# Patient Record
Sex: FEMALE | Race: OTHER | HISPANIC OR LATINO | Employment: FULL TIME | URBAN - METROPOLITAN AREA
[De-identification: names, ages, dates, MRNs, and addresses within clinical notes are randomized per-mention and may not be internally consistent; named-entity substitution may affect disease eponyms.]

---

## 2023-03-07 PROBLEM — B00.1 HERPES LABIALIS: Status: ACTIVE | Noted: 2023-03-07

## 2023-03-07 PROBLEM — R10.30 LOWER ABDOMINAL PAIN: Status: ACTIVE | Noted: 2023-03-07

## 2023-03-07 PROBLEM — E03.9 HYPOTHYROIDISM: Status: ACTIVE | Noted: 2023-03-07

## 2023-03-07 PROBLEM — J45.909 ASTHMA (HHS-HCC): Status: ACTIVE | Noted: 2023-03-07

## 2023-03-07 PROBLEM — K92.1 BLOOD IN STOOL: Status: ACTIVE | Noted: 2023-03-07

## 2023-03-07 PROBLEM — E04.1 THYROID NODULE: Status: ACTIVE | Noted: 2023-03-07

## 2023-03-07 PROBLEM — D72.819 LEUKOPENIA: Status: ACTIVE | Noted: 2023-03-07

## 2023-03-07 RX ORDER — LORATADINE 10 MG/1
1 TABLET ORAL DAILY
COMMUNITY
Start: 2022-12-02

## 2023-03-07 RX ORDER — RIBOFLAVIN (VITAMIN B2) 100 MG
1 TABLET ORAL DAILY
COMMUNITY
Start: 2022-12-02 | End: 2023-03-29 | Stop reason: ALTCHOICE

## 2023-03-07 RX ORDER — GLUCOSAMINE SULFATE 500 MG
1 TABLET ORAL DAILY
COMMUNITY
Start: 2022-12-02 | End: 2023-05-17 | Stop reason: ALTCHOICE

## 2023-03-07 RX ORDER — MINOCYCLINE HYDROCHLORIDE 100 MG/1
1 TABLET ORAL NIGHTLY
COMMUNITY
Start: 2022-12-02 | End: 2023-05-17 | Stop reason: ALTCHOICE

## 2023-03-07 RX ORDER — ALBUTEROL SULFATE 90 UG/1
2 AEROSOL, METERED RESPIRATORY (INHALATION)
COMMUNITY
Start: 2022-12-02 | End: 2023-04-12

## 2023-03-07 RX ORDER — ASPIRIN 325 MG
1 TABLET, DELAYED RELEASE (ENTERIC COATED) ORAL
COMMUNITY
Start: 2022-12-02

## 2023-03-07 RX ORDER — LEVOTHYROXINE SODIUM 112 UG/1
1 TABLET ORAL DAILY
COMMUNITY
Start: 2022-12-02 | End: 2023-03-29

## 2023-03-07 RX ORDER — IBUPROFEN 100 MG/5ML
1 SUSPENSION, ORAL (FINAL DOSE FORM) ORAL DAILY
COMMUNITY
Start: 2022-12-02

## 2023-03-07 RX ORDER — LANOLIN ALCOHOL/MO/W.PET/CERES
1 CREAM (GRAM) TOPICAL DAILY
COMMUNITY
Start: 2022-12-02

## 2023-03-15 ENCOUNTER — APPOINTMENT (OUTPATIENT)
Dept: PRIMARY CARE | Facility: CLINIC | Age: 28
End: 2023-03-15
Payer: COMMERCIAL

## 2023-03-21 ENCOUNTER — APPOINTMENT (OUTPATIENT)
Dept: PRIMARY CARE | Facility: CLINIC | Age: 28
End: 2023-03-21
Payer: COMMERCIAL

## 2023-03-22 ENCOUNTER — APPOINTMENT (OUTPATIENT)
Dept: PRIMARY CARE | Facility: CLINIC | Age: 28
End: 2023-03-22
Payer: COMMERCIAL

## 2023-03-29 ENCOUNTER — LAB (OUTPATIENT)
Dept: LAB | Facility: LAB | Age: 28
End: 2023-03-29
Payer: COMMERCIAL

## 2023-03-29 ENCOUNTER — OFFICE VISIT (OUTPATIENT)
Dept: PRIMARY CARE | Facility: CLINIC | Age: 28
End: 2023-03-29
Payer: COMMERCIAL

## 2023-03-29 VITALS
DIASTOLIC BLOOD PRESSURE: 69 MMHG | BODY MASS INDEX: 27.51 KG/M2 | OXYGEN SATURATION: 99 % | HEIGHT: 62 IN | HEART RATE: 88 BPM | SYSTOLIC BLOOD PRESSURE: 113 MMHG

## 2023-03-29 DIAGNOSIS — E06.3 HYPOTHYROIDISM DUE TO HASHIMOTO'S THYROIDITIS: ICD-10-CM

## 2023-03-29 DIAGNOSIS — F41.9 ANXIETY: ICD-10-CM

## 2023-03-29 DIAGNOSIS — R00.2 PALPITATION: ICD-10-CM

## 2023-03-29 DIAGNOSIS — E06.3 HYPOTHYROIDISM DUE TO HASHIMOTO'S THYROIDITIS: Primary | ICD-10-CM

## 2023-03-29 DIAGNOSIS — E03.8 HYPOTHYROIDISM DUE TO HASHIMOTO'S THYROIDITIS: Primary | ICD-10-CM

## 2023-03-29 DIAGNOSIS — E03.8 HYPOTHYROIDISM DUE TO HASHIMOTO'S THYROIDITIS: ICD-10-CM

## 2023-03-29 LAB
MAGNESIUM (MG/DL) IN SER/PLAS: 2.05 MG/DL (ref 1.6–2.4)
THYROTROPIN (MIU/L) IN SER/PLAS BY DETECTION LIMIT <= 0.05 MIU/L: 0.55 MIU/L (ref 0.44–3.98)
TRIIODOTHYRONINE (T3) FREE (PG/ML) IN SER/PLAS: 4.1 PG/ML (ref 2.3–4.2)

## 2023-03-29 PROCEDURE — 83735 ASSAY OF MAGNESIUM: CPT

## 2023-03-29 PROCEDURE — 84443 ASSAY THYROID STIM HORMONE: CPT

## 2023-03-29 PROCEDURE — 84481 FREE ASSAY (FT-3): CPT

## 2023-03-29 PROCEDURE — 1036F TOBACCO NON-USER: CPT | Performed by: FAMILY MEDICINE

## 2023-03-29 PROCEDURE — 99214 OFFICE O/P EST MOD 30 MIN: CPT | Performed by: FAMILY MEDICINE

## 2023-03-29 PROCEDURE — 36415 COLL VENOUS BLD VENIPUNCTURE: CPT

## 2023-03-29 RX ORDER — LEVOTHYROXINE SODIUM 125 UG/1
125 CAPSULE ORAL
COMMUNITY
End: 2023-11-08 | Stop reason: ALTCHOICE

## 2023-03-29 RX ORDER — HYDROXYZINE HYDROCHLORIDE 25 MG/1
25 TABLET, FILM COATED ORAL 3 TIMES DAILY
Qty: 90 TABLET | Refills: 0 | Status: SHIPPED | OUTPATIENT
Start: 2023-03-29 | End: 2023-11-03 | Stop reason: ALTCHOICE

## 2023-03-29 RX ORDER — FERROUS SULFATE 325(65) MG
65 TABLET ORAL
COMMUNITY

## 2023-03-29 RX ORDER — CLONAZEPAM 0.5 MG/1
TABLET ORAL
Qty: 7 TABLET | Refills: 0 | Status: SHIPPED | OUTPATIENT
Start: 2023-03-29 | End: 2024-01-15 | Stop reason: WASHOUT

## 2023-03-29 NOTE — PROGRESS NOTES
Subjective   Patient ID: Nargis Strong [id number] is a 27 y.o. female who presents for Follow-up (Discuss meds).    HPI   Hypothyroidism - on levothyroxine 125 mcg daily.   By endocrine in February.  Levothyroxine was increased from 1 12 to 125mg  end of February.  Taking medication as prescribed.    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   Pt is noticing Sweating , palpitations and passed out at work. BP was normal.  Pt is increasing anxiety, having CP and Shakes and unable to sleep.  Raising thoughts +  Increasing fatigue, nausea and unable to eat.   Grand father passed away 2 weeks ago  due to major Heart attack.   Very restless and not sleeping.  Tired all day , worries a lot.   Denies feeling sad or low.  No SI or HI.  No crying spells.  No other triggers  Patient work environment is good and enjoying nursing    Social History     Tobacco Use    Smoking status: Never    Smokeless tobacco: Never   Vaping Use    Vaping status: Former     Substances: Nicotine, Flavoring     Devices: Disposable   Substance Use Topics    Drug use: Not Currently     Types: Marijuana       No Known Allergies    Current Outpatient Medications   Medication Sig Dispense Refill    ascorbic acid (Vitamin C) 1,000 mg tablet Take 1 tablet (1,000 mg) by mouth once daily.      cholecalciferol (Vitamin D-3) 1,250 mcg (50,000 unit) capsule Take 1 capsule (50,000 Units) by mouth 1 (one) time per week.      levothyroxine (Synthroid, Levoxyl) 112 mcg tablet Take 1 tablet (112 mcg) by mouth once daily.      loratadine (Claritin) 10 mg tablet Take 1 tablet (10 mg) by mouth once daily.      magnesium oxide (Mag-Ox) 400 mg (241.3 mg magnesium) tablet Take 1 tablet (400 mg) by mouth once daily.      minocycline (Dynacin) 100 mg tablet Take 1 tablet (100 mg) by mouth once daily at bedtime.      albuterol 90 mcg/actuation inhaler Inhale 2 puffs. INHALE 2 PUFFS BY MOUTH PRIOR  TO EXERCISE DIRECTED      ferrous sulfate 325 (65 Fe) MG  "tablet Take 1 tablet (65 mg of iron) by mouth once daily with a meal.      lysine 1,000 mg tablet Take 1 tablet (1,000 mg) by mouth once daily.       No current facility-administered medications for this visit.       Physical Exam  /69   Pulse 88   Ht 1.562 m (5' 1.5\")   SpO2 99%   BMI 27.51 kg/m²     General Appearance:  Alert, cooperative, no distress,   Head:  Normocephalic, atraumatic   Eyes:  PERRL, conjunctiva/corneas clear, EOM's intact,    Lungs:   Clear to auscultation bilaterally, respirations unlabored   Heart:  Regular rate and rhythm, S1 and S2 normal, no murmur,    Neurologic: Normal      No visits with results within 3 Month(s) from this visit.   Latest known visit with results is:   [facility] Encounter on 12/28/2022   Component Date Value Ref Range Status    TSH 12/28/2022 5.16 (H)  0.44 - 3.98 mIU/L Final    Comment:  TSH testing is performed using different testing    methodology at [facility] than at other    [facility]. Direct result comparisons should    only be made within the same method.      Vitamin D, 25-Hydroxy 12/28/2022 18 (A)  ng/mL Final    Comment: .  DEFICIENCY:         < 20   NG/ML  INSUFFICIENCY:      20-29  NG/ML  SUFFICIENCY:         NG/ML    THIS ASSAY ACCURATELY QUANTIFIES THE SUM OF  VITAMIN D3, 25-HYDROXY AND VIT D2,25-HYDROXY.      Glucose 12/28/2022 92  74 - 99 mg/dL Final    Sodium 12/28/2022 138  136 - 145 mmol/L Final    Potassium 12/28/2022 3.9  3.5 - 5.3 mmol/L Final    Chloride 12/28/2022 105  98 - 107 mmol/L Final    Bicarbonate 12/28/2022 27  21 - 32 mmol/L Final    Anion Gap 12/28/2022 10  10 - 20 mmol/L Final    Urea Nitrogen 12/28/2022 11  6 - 23 mg/dL Final    Creatinine 12/28/2022 0.69  0.50 - 1.05 mg/dL Final    GFR Female 12/28/2022 >90  >90 mL/min/1.73m2 Final    Comment:  CALCULATIONS OF ESTIMATED GFR ARE PERFORMED   USING THE 2021 CKD-EPI STUDY REFIT EQUATION   WITHOUT THE RACE VARIABLE FOR THE IDMS-TRACEABLE   CREATININE " METHODS.    https://jasn.asnjournals.org/content/early/2021/09/22/ASN.9381511886      Calcium 12/28/2022 8.8  8.6 - 10.3 mg/dL Final    Albumin 12/28/2022 4.0  3.4 - 5.0 g/dL Final    Alkaline Phosphatase 12/28/2022 58  33 - 110 U/L Final    Total Protein 12/28/2022 7.5  6.4 - 8.2 g/dL Final    AST 12/28/2022 18  9 - 39 U/L Final    Total Bilirubin 12/28/2022 0.4  0.0 - 1.2 mg/dL Final    ALT (SGPT) 12/28/2022 14  7 - 45 U/L Final    Comment:  Patients treated with Sulfasalazine may generate    falsely decreased results for ALT.         Assessment/Plan   Diagnoses and all orders for this visit:  Hypothyroidism due to Hashimoto's thyroiditis  -     TSH with reflex to Free T4 if abnormal; Future  -     T3, free; Future  Anxiety  -     Magnesium; Future  -     hydrOXYzine HCL (Atarax) 25 mg tablet; Take 1 tablet (25 mg) by mouth in the morning and 1 tablet (25 mg) in the evening and 1 tablet (25 mg) before bedtime.  Palpitation  -     TSH with reflex to Free T4 if abnormal; Future  -     Magnesium; Future  -     clonazePAM (KlonoPIN) 0.5 mg tablet; 0. 5 to 1 tablet at night for sleep as needed    7 tablet of low-dose Klonopin given for sleep.  OARRS reviewed and appropriate.  Will reevaluate anxiety and thyroid in 1 week

## 2023-04-05 ENCOUNTER — APPOINTMENT (OUTPATIENT)
Dept: PRIMARY CARE | Facility: CLINIC | Age: 28
End: 2023-04-05
Payer: COMMERCIAL

## 2023-04-06 ENCOUNTER — TELEPHONE (OUTPATIENT)
Dept: PRIMARY CARE | Facility: CLINIC | Age: 28
End: 2023-04-06
Payer: COMMERCIAL

## 2023-04-06 NOTE — TELEPHONE ENCOUNTER
PT IS REQUESTING A B.W ORDER FOR PREGANANCY. SHE HAS MISSED 2 PERIODS AND HAS TAKEN 3 HOME PREGENCY TESTS AND ALL TEST SHOWED NOT PREG OR PREGNANT  PLEASE ADVISE

## 2023-04-07 ENCOUNTER — APPOINTMENT (OUTPATIENT)
Dept: LAB | Facility: LAB | Age: 28
End: 2023-04-07
Payer: COMMERCIAL

## 2023-04-07 LAB — CHORIOGONADOTROPIN (MIU/ML) IN SER/PLAS: <3 IU/L

## 2023-04-10 ENCOUNTER — TELEPHONE (OUTPATIENT)
Dept: PRIMARY CARE | Facility: CLINIC | Age: 28
End: 2023-04-10
Payer: COMMERCIAL

## 2023-04-10 NOTE — TELEPHONE ENCOUNTER
----- Message from Leonora Schuler MD sent at 4/9/2023  6:07 PM EDT -----  Pregnancy test negative

## 2023-04-12 ENCOUNTER — LAB (OUTPATIENT)
Dept: LAB | Facility: LAB | Age: 28
End: 2023-04-12
Payer: COMMERCIAL

## 2023-04-12 ENCOUNTER — OFFICE VISIT (OUTPATIENT)
Dept: PRIMARY CARE | Facility: CLINIC | Age: 28
End: 2023-04-12
Payer: COMMERCIAL

## 2023-04-12 VITALS
WEIGHT: 153 LBS | SYSTOLIC BLOOD PRESSURE: 117 MMHG | DIASTOLIC BLOOD PRESSURE: 77 MMHG | BODY MASS INDEX: 28.16 KG/M2 | HEART RATE: 80 BPM | HEIGHT: 62 IN | OXYGEN SATURATION: 99 %

## 2023-04-12 DIAGNOSIS — E03.9 ADULT HYPOTHYROIDISM: ICD-10-CM

## 2023-04-12 DIAGNOSIS — E03.9 ADULT HYPOTHYROIDISM: Primary | ICD-10-CM

## 2023-04-12 DIAGNOSIS — F41.9 ANXIETY: ICD-10-CM

## 2023-04-12 PROCEDURE — 1036F TOBACCO NON-USER: CPT | Performed by: FAMILY MEDICINE

## 2023-04-12 PROCEDURE — 99214 OFFICE O/P EST MOD 30 MIN: CPT | Performed by: FAMILY MEDICINE

## 2023-04-12 PROCEDURE — 84439 ASSAY OF FREE THYROXINE: CPT

## 2023-04-12 PROCEDURE — 36415 COLL VENOUS BLD VENIPUNCTURE: CPT

## 2023-04-12 RX ORDER — LEVOTHYROXINE SODIUM 112 UG/1
TABLET ORAL
Qty: 30 TABLET | Refills: 0 | Status: SHIPPED | OUTPATIENT
Start: 2023-04-12 | End: 2023-11-08 | Stop reason: ALTCHOICE

## 2023-04-12 RX ORDER — ESCITALOPRAM OXALATE 5 MG/1
5 TABLET ORAL DAILY
Qty: 30 TABLET | Refills: 0 | Status: SHIPPED | OUTPATIENT
Start: 2023-04-12 | End: 2023-11-03 | Stop reason: ALTCHOICE

## 2023-04-12 NOTE — PROGRESS NOTES
Subjective   Patient ID: Nargis Strong [id number] is a 27 y.o. female who presents for Follow-up (1 week follow up).    HPI     Pt is here for follow up on Anxiety.  Pt is taking hydroxyzine 25 mg BID every other day and taking clonapin every other day.   Feeling better.  Patient feeling calmer.  No nausea or vomiting  Sleeping better  No Chest pain or shakes  No crying spells sleeping good.  Racing thoughts at night getting better  Lab testing for thyroid reviewed.  Hypothyroid - taking levothyroxine 125 mcg daily    Social History     Tobacco Use    Smoking status: Never    Smokeless tobacco: Never   Vaping Use    Vaping status: Former     Substances: Nicotine, Flavoring     Devices: Disposable   Substance Use Topics    Drug use: Not Currently     Types: Marijuana       No Known Allergies    Current Outpatient Medications   Medication Sig Dispense Refill    ascorbic acid (Vitamin C) 1,000 mg tablet Take 1 tablet (1,000 mg) by mouth once daily.      cholecalciferol (Vitamin D-3) 1,250 mcg (50,000 unit) capsule Take 1 capsule (50,000 Units) by mouth 1 (one) time per week.      clonazePAM (KlonoPIN) 0.5 mg tablet 0. 5 to 1 tablet at night for sleep as needed 7 tablet 0    ferrous sulfate 325 (65 Fe) MG tablet Take 1 tablet (65 mg of iron) by mouth once daily with a meal.      hydrOXYzine HCL (Atarax) 25 mg tablet Take 1 tablet (25 mg) by mouth in the morning and 1 tablet (25 mg) in the evening and 1 tablet (25 mg) before bedtime. 90 tablet 0    levothyroxine (Tirosint) 125 mcg capsule Take 1 capsule (125 mcg) by mouth once daily in the morning. Take before meals.      loratadine (Claritin) 10 mg tablet Take 1 tablet (10 mg) by mouth once daily.      magnesium oxide (Mag-Ox) 400 mg (241.3 mg magnesium) tablet Take 1 tablet (400 mg) by mouth once daily. Taking 350mg now      minocycline (Dynacin) 100 mg tablet Take 1 tablet (100 mg) by mouth once daily at bedtime.      albuterol 90 mcg/actuation inhaler Inhale 2 puffs.  "INHALE 2 PUFFS BY MOUTH PRIOR  TO EXERCISE DIRECTED      lysine 1,000 mg tablet Take 1 tablet (1,000 mg) by mouth once daily.       No current facility-administered medications for this visit.       Physical Exam  /77 (BP Location: Right arm, Patient Position: Sitting, BP Cuff Size: Adult)   Pulse 80   Ht 1.562 m (5' 1.5\")   Wt 69.4 kg (153 lb)   SpO2 99%   BMI 28.44 kg/m²     General Appearance:  Alert, cooperative, no distress,   Head:  Normocephalic, atraumatic   Eyes:  PERRL, conjunctiva/corneas clear, EOM's intact,    Lungs:   Clear to auscultation bilaterally, respirations unlabored   Heart:  Regular rate and rhythm, S1 and S2 normal, no murmur,    Neurologic: Normal      Orders Only on 04/07/2023   Component Date Value Ref Range Status    hCG Quantitative 04/07/2023 <3  IU/L Final    Comment: .  Total HCG measurement is performed using the Siemens [id number]  immunoassay which detects intact HCG and free beta HCG subunit.  .  This test is not indicated for use as a tumor marker.  HCG testing is performed using a different test methodology at [facility] than other [facility]. Direct result comparison  should only be made within the same method.  .  REF VALUES  NONPREGNANT FEMALE <5  MALES              <5     Lab on 03/29/2023   Component Date Value Ref Range Status    TSH 03/29/2023 0.55  0.44 - 3.98 mIU/L Final     TSH testing is performed using different testing    methodology at [facility] than at other    [facility]. Direct result comparisons should    only be made within the same method.    Magnesium 03/29/2023 2.05  1.60 - 2.40 mg/dL Final    T3, Free 03/29/2023 4.1  2.3 - 4.2 pg/mL Final       Assessment/Plan   Diagnoses and all orders for this visit:  Adult hypothyroidism  -     levothyroxine (Synthroid, Levoxyl) 112 mcg tablet; Take 1 tablet on Saturday and sunday  -     T4, free; Future  Patient was advised to get free T4.  Advised to continue " levothyroxine 125 on weekdays and take 112 on Saturday and Sunday.    Anxiety   advised to continue hydroxyzine as needed  -     escitalopram (Lexapro) 5 mg tablet; Take 1 tablet (5 mg) by mouth once daily.

## 2023-04-13 LAB — THYROXINE (T4) FREE (NG/DL) IN SER/PLAS: 0.98 NG/DL (ref 0.78–1.48)

## 2023-04-26 ENCOUNTER — APPOINTMENT (OUTPATIENT)
Dept: PRIMARY CARE | Facility: CLINIC | Age: 28
End: 2023-04-26
Payer: COMMERCIAL

## 2023-05-04 ENCOUNTER — APPOINTMENT (OUTPATIENT)
Dept: PRIMARY CARE | Facility: CLINIC | Age: 28
End: 2023-05-04
Payer: COMMERCIAL

## 2023-05-11 ENCOUNTER — TELEPHONE (OUTPATIENT)
Dept: PRIMARY CARE | Facility: CLINIC | Age: 28
End: 2023-05-11
Payer: COMMERCIAL

## 2023-05-11 NOTE — TELEPHONE ENCOUNTER
Pt. Tried scheduling with derm doctor but the earliest she can have an appt is mid august. Would like to know if she can get prescribe medicine for her medical condition or if she can get referred to someone else.

## 2023-05-17 ENCOUNTER — TELEMEDICINE (OUTPATIENT)
Dept: PRIMARY CARE | Facility: CLINIC | Age: 28
End: 2023-05-17
Payer: COMMERCIAL

## 2023-05-17 DIAGNOSIS — L70.0 ACNE VULGARIS: Primary | ICD-10-CM

## 2023-05-17 PROCEDURE — 99213 OFFICE O/P EST LOW 20 MIN: CPT | Performed by: FAMILY MEDICINE

## 2023-05-17 RX ORDER — MINOCYCLINE HYDROCHLORIDE 100 MG/1
100 TABLET ORAL NIGHTLY
Qty: 90 TABLET | Refills: 0 | Status: SHIPPED | OUTPATIENT
Start: 2023-05-17 | End: 2023-06-15 | Stop reason: SDUPTHER

## 2023-05-17 NOTE — PROGRESS NOTES
This visit was completed via VIRTUAL VIDEO/Audio due to the restrictions of the COVID-19 pandemic. All issues as below were discussed and addressed but no physical exam was performed. If it was felt that the patient should be evaluated in clinic then they were directed there. The patient verbally consented to visit.      Patient ID: Nargis Strong [id number] is a 27 y.o. female who presents  calling for concern for acne.    HPI   Patient was only mildly slightly couple of years ago for acne by her dermatologist.  Medication was working very well.  Patient ran out of refill.  Patient does not have appointment with dermatology till August 2023.  No help with gel or lotion.  Patient is asking for minocycline.    Social History     Tobacco Use    Smoking status: Never    Smokeless tobacco: Never   Vaping Use    Vaping status: Former     Substances: Nicotine, Flavoring     Devices: Disposable   Substance Use Topics    Drug use: Not Currently     Types: Marijuana       No Known Allergies    Current Outpatient Medications   Medication Sig Dispense Refill    ascorbic acid (Vitamin C) 1,000 mg tablet Take 1 tablet (1,000 mg) by mouth once daily.      cholecalciferol (Vitamin D-3) 1,250 mcg (50,000 unit) capsule Take 1 capsule (50,000 Units) by mouth 1 (one) time per week.      clonazePAM (KlonoPIN) 0.5 mg tablet 0. 5 to 1 tablet at night for sleep as needed 7 tablet 0    escitalopram (Lexapro) 5 mg tablet Take 1 tablet (5 mg) by mouth once daily. 30 tablet 0    ferrous sulfate 325 (65 Fe) MG tablet Take 1 tablet (65 mg of iron) by mouth once daily with a meal.      hydrOXYzine HCL (Atarax) 25 mg tablet Take 1 tablet (25 mg) by mouth in the morning and 1 tablet (25 mg) in the evening and 1 tablet (25 mg) before bedtime. 90 tablet 0    levothyroxine (Synthroid, Levoxyl) 112 mcg tablet Take 1 tablet on Saturday and sunday 30 tablet 0    levothyroxine (Tirosint) 125 mcg capsule Take 1 capsule (125 mcg) by mouth once daily in the  morning. Take before meals.      loratadine (Claritin) 10 mg tablet Take 1 tablet (10 mg) by mouth once daily.      lysine 1,000 mg tablet Take 1 tablet (1,000 mg) by mouth once daily.      magnesium oxide (Mag-Ox) 400 mg (241.3 mg magnesium) tablet Take 1 tablet (400 mg) by mouth once daily. Taking 350mg now      minocycline (Dynacin) 100 mg tablet Take 1 tablet (100 mg) by mouth once daily at bedtime.       No current facility-administered medications for this visit.       There were no vitals taken for this visit.      Assessment/Plan   Diagnoses and all orders  for this visit:  Acne vulgaris  -     minocycline (Dynacin) 100 mg tablet; Take 1 tablet (100 mg) by mouth once daily at bedtime.  Patient currently having regular Periods.   No concern for pregnancy.  Follow-up dermatology.

## 2023-05-31 ENCOUNTER — OFFICE VISIT (OUTPATIENT)
Dept: PRIMARY CARE | Facility: CLINIC | Age: 28
End: 2023-05-31
Payer: COMMERCIAL

## 2023-05-31 VITALS
HEIGHT: 61 IN | SYSTOLIC BLOOD PRESSURE: 104 MMHG | WEIGHT: 142.2 LBS | BODY MASS INDEX: 26.85 KG/M2 | OXYGEN SATURATION: 98 % | HEART RATE: 70 BPM | DIASTOLIC BLOOD PRESSURE: 72 MMHG

## 2023-05-31 DIAGNOSIS — J30.2 SEASONAL ALLERGIES: ICD-10-CM

## 2023-05-31 DIAGNOSIS — J30.9 CHRONIC ALLERGIC RHINITIS: Primary | ICD-10-CM

## 2023-05-31 PROCEDURE — 1036F TOBACCO NON-USER: CPT | Performed by: FAMILY MEDICINE

## 2023-05-31 PROCEDURE — 99213 OFFICE O/P EST LOW 20 MIN: CPT | Performed by: FAMILY MEDICINE

## 2023-05-31 PROCEDURE — 96372 THER/PROPH/DIAG INJ SC/IM: CPT | Performed by: FAMILY MEDICINE

## 2023-05-31 RX ORDER — IBUPROFEN 800 MG/1
800 TABLET ORAL EVERY 6 HOURS PRN
COMMUNITY
Start: 2023-01-03 | End: 2023-11-03 | Stop reason: ALTCHOICE

## 2023-05-31 RX ORDER — VALACYCLOVIR HYDROCHLORIDE 1 G/1
2000 TABLET, FILM COATED ORAL AS NEEDED
COMMUNITY
Start: 2023-01-31 | End: 2023-11-07 | Stop reason: SDUPTHER

## 2023-05-31 RX ORDER — TRIAMCINOLONE ACETONIDE 40 MG/ML
20 INJECTION, SUSPENSION INTRA-ARTICULAR; INTRAMUSCULAR ONCE
Status: COMPLETED | OUTPATIENT
Start: 2023-05-31 | End: 2023-05-31

## 2023-05-31 RX ADMIN — TRIAMCINOLONE ACETONIDE 20 MG: 40 INJECTION, SUSPENSION INTRA-ARTICULAR; INTRAMUSCULAR at 11:47

## 2023-05-31 NOTE — LETTER
May 31, 2023     Patient: Nargis Strong   YOB: 1995   Date of Visit: 5/31/2023       To Whom It May Concern:    It is my medical opinion that Nargis Strong {Work release (duty restriction):54609}.    If you have any questions or concerns, please don't hesitate to call.         Sincerely,        Leonora Schuler MD    CC:   No Recipients

## 2023-05-31 NOTE — PROGRESS NOTES
Subjective   Patient ID: Nargis Strong [id number] is a 27 y.o. female who presents for Injections (Kenolog injection for allergies).    HPI     Pt is taking claritin, flonase and benadryl for seasonal allergies.  Stuffy nose, runny nose, post nasal drip , sneezing  Itchy eyes and watery eyes.  Pt usually  get kenalog shot once a year in spring or summer.   Social History     Tobacco Use    Smoking status: Never    Smokeless tobacco: Never   Vaping Use    Vaping status: Former     Substances: Nicotine, Flavoring     Devices: Disposable   Substance Use Topics    Drug use: Not Currently     Types: Marijuana       Allergies   Allergen Reactions    Seasonale (91) [Levonorgestrel-Ethinyl Estrad] Runny nose       Current Outpatient Medications   Medication Sig Dispense Refill    ascorbic acid (Vitamin C) 1,000 mg tablet Take 1 tablet (1,000 mg) by mouth once daily.      cholecalciferol (Vitamin D-3) 1,250 mcg (50,000 unit) capsule Take 1 capsule (50,000 Units) by mouth 1 (one) time per week.      clonazePAM (KlonoPIN) 0.5 mg tablet 0. 5 to 1 tablet at night for sleep as needed 7 tablet 0    escitalopram (Lexapro) 5 mg tablet Take 1 tablet (5 mg) by mouth once daily. 30 tablet 0    ferrous sulfate 325 (65 Fe) MG tablet Take 1 tablet (65 mg of iron) by mouth once daily with a meal.      ibuprofen 800 mg tablet Take 1 tablet (800 mg) by mouth every 6 hours if needed for mild pain (1 - 3).      levothyroxine (Synthroid, Levoxyl) 112 mcg tablet Take 1 tablet on Saturday and sunday 30 tablet 0    levothyroxine (Tirosint) 125 mcg capsule Take 1 capsule (125 mcg) by mouth once daily in the morning. Take before meals.      loratadine (Claritin) 10 mg tablet Take 1 tablet (10 mg) by mouth once daily.      magnesium oxide (Mag-Ox) 400 mg (241.3 mg magnesium) tablet Take 1 tablet (400 mg) by mouth once daily. Taking 350mg now      minocycline (Dynacin) 100 mg tablet Take 1 tablet (100 mg) by mouth once daily at bedtime. 90 tablet 0     "valACYclovir (Valtrex) 1 gram tablet Take 2 tablets (2,000 mg) by mouth if needed.      hydrOXYzine HCL (Atarax) 25 mg tablet Take 1 tablet (25 mg) by mouth in the morning and 1 tablet (25 mg) in the evening and 1 tablet (25 mg) before bedtime. 90 tablet 0     No current facility-administered medications for this visit.       Physical Exam  /72   Pulse 70   Ht 1.549 m (5' 1\")   Wt 64.5 kg (142 lb 3.2 oz)   SpO2 98%   BMI 26.87 kg/m²     General Appearance:  Alert, cooperative, no distress,   Head:  Normocephalic, atraumatic   Eyes:  PERRL, conjunctiva/corneas clear, EOM's intact,    Lungs:   Clear to auscultation bilaterally, respirations unlabored   Heart:  Regular rate and rhythm, S1 and S2 normal, no murmur,    Neurologic: Normal      Lab on 04/12/2023   Component Date Value Ref Range Status    Free T4 04/12/2023 0.98  0.78 - 1.48 ng/dL Final     Thyroxine Free testing is performed using different testing    methodology at [facility] than at other    [facility]. Direct result comparisons should    only be made within the same method.   Orders Only on 04/07/2023   Component Date Value Ref Range Status    hCG Quantitative 04/07/2023 <3  IU/L Final    Comment: .  Total HCG measurement is performed using the Siemens Atellica  immunoassay which detects intact HCG and free beta HCG subunit.  .  This test is not indicated for use as a tumor marker.  HCG testing is performed using a different test methodology at [facility] than other [facility]. Direct result comparison  should only be made within the same method.  .  REF VALUES  NONPREGNANT FEMALE <5  MALES              <5     Lab on 03/29/2023   Component Date Value Ref Range Status    TSH 03/29/2023 0.55  0.44 - 3.98 mIU/L Final     TSH testing is performed using different testing    methodology at [facility] than at other    [facility]. Direct result comparisons should    only be made within the " same method.    Magnesium 03/29/2023 2.05  1.60 - 2.40 mg/dL Final    T3, Free 03/29/2023 4.1  2.3 - 4.2 pg/mL Final       Assessment/Plan   Diagnoses and all orders for this visit:  Chronic allergic rhinitis  -     triamcinolone acetonide (Kenalog-40) injection 20 mg  Seasonal allergies  -     triamcinolone acetonide (Kenalog-40) injection 20 mg  Kenalog 20 given in the office.  Patient was advised to return to office if persistent symptoms.

## 2023-05-31 NOTE — LETTER
May 31, 2023     Patient: Nargis Strong   YOB: 1995   Date of Visit: 5/31/2023       To Whom It May Concern:    Nargis Strong was seen in my clinic on 5/31/2023 at 10:30 am. Please excuse Nargis for her absence from work on this day to make the appointment.    If you have any questions or concerns, please don't hesitate to call.         Sincerely,         Leonora Schuler MD        CC:   No Recipients

## 2023-05-31 NOTE — LETTER
May 31, 2023     Patient: Nargis Strong   YOB: 1995   Date of Visit: 5/31/2023       To Whom It May Concern:    Nargis Strong was seen in my clinic on 5/31/2023 at 10:30 am. Please excuse Nargis for her absence from work on this day to make the appointment.    If you have any questions or concerns, please don't hesitate to call.    Nargis left our office at 11:45am.         Sincerely,         Leonora Schuler MD        CC:   No Recipients

## 2023-06-15 DIAGNOSIS — L70.0 ACNE VULGARIS: ICD-10-CM

## 2023-06-15 RX ORDER — MINOCYCLINE HYDROCHLORIDE 100 MG/1
100 TABLET ORAL NIGHTLY
Qty: 90 TABLET | Refills: 0 | Status: SHIPPED | OUTPATIENT
Start: 2023-06-15 | End: 2023-11-16 | Stop reason: SDUPTHER

## 2023-07-07 ENCOUNTER — TELEPHONE (OUTPATIENT)
Dept: PRIMARY CARE | Facility: CLINIC | Age: 28
End: 2023-07-07
Payer: COMMERCIAL

## 2023-07-07 DIAGNOSIS — F41.9 ANXIETY: ICD-10-CM

## 2023-07-07 DIAGNOSIS — R53.83 OTHER FATIGUE: ICD-10-CM

## 2023-07-07 NOTE — TELEPHONE ENCOUNTER
PT STOPPED INTO OFFICE. STATES SHE NEEDS THE FOLLOWING ORDERED:    CBC  CMP  BMP  IRON  VIT D  LIPID  GFR  8.   TSH  9.   FT4  HAS APPT WITH OB/GYN 7/19/23

## 2023-07-11 ENCOUNTER — TELEPHONE (OUTPATIENT)
Dept: PRIMARY CARE | Facility: CLINIC | Age: 28
End: 2023-07-11
Payer: COMMERCIAL

## 2023-07-11 NOTE — TELEPHONE ENCOUNTER
Blood in stool for two days, but she isn't sure if it is something she needs to do now or wait for the appointment?      She does not see GI for a month and a half.  She is wondering what it may be and if the Dr needs to order a test or see her in office?    She does not want to go to ER.

## 2023-07-12 NOTE — TELEPHONE ENCOUNTER
Called pt to schedule appt for tomorrow pt states she has to work tomorrow and requested testing or virtual appt but does not want to wait. Was advised if gets worse she can go to ER or we can see her next available.

## 2023-08-02 ENCOUNTER — APPOINTMENT (OUTPATIENT)
Dept: PRIMARY CARE | Facility: CLINIC | Age: 28
End: 2023-08-02
Payer: COMMERCIAL

## 2023-09-09 ENCOUNTER — LAB (OUTPATIENT)
Dept: LAB | Facility: LAB | Age: 28
End: 2023-09-09
Payer: COMMERCIAL

## 2023-09-09 LAB — TOBACCO SCREEN, URINE: NEGATIVE

## 2023-11-02 ENCOUNTER — APPOINTMENT (OUTPATIENT)
Dept: ALLERGY | Facility: CLINIC | Age: 28
End: 2023-11-02
Payer: COMMERCIAL

## 2023-11-03 ENCOUNTER — LAB (OUTPATIENT)
Dept: LAB | Facility: LAB | Age: 28
End: 2023-11-03
Payer: COMMERCIAL

## 2023-11-03 ENCOUNTER — OFFICE VISIT (OUTPATIENT)
Dept: PRIMARY CARE | Facility: CLINIC | Age: 28
End: 2023-11-03
Payer: COMMERCIAL

## 2023-11-03 VITALS
HEART RATE: 84 BPM | HEIGHT: 61 IN | SYSTOLIC BLOOD PRESSURE: 117 MMHG | TEMPERATURE: 97.3 F | WEIGHT: 165 LBS | DIASTOLIC BLOOD PRESSURE: 77 MMHG | BODY MASS INDEX: 31.15 KG/M2

## 2023-11-03 DIAGNOSIS — F41.9 ANXIETY: Primary | ICD-10-CM

## 2023-11-03 DIAGNOSIS — Z88.9 H/O SEASONAL ALLERGIES: ICD-10-CM

## 2023-11-03 DIAGNOSIS — F41.9 ANXIETY: ICD-10-CM

## 2023-11-03 DIAGNOSIS — R53.83 OTHER FATIGUE: ICD-10-CM

## 2023-11-03 DIAGNOSIS — R63.5 WEIGHT GAIN: ICD-10-CM

## 2023-11-03 DIAGNOSIS — E03.8 OTHER SPECIFIED HYPOTHYROIDISM: ICD-10-CM

## 2023-11-03 LAB
25(OH)D3 SERPL-MCNC: 20 NG/ML (ref 30–100)
ALBUMIN SERPL BCP-MCNC: 4 G/DL (ref 3.4–5)
ALP SERPL-CCNC: 58 U/L (ref 33–110)
ALT SERPL W P-5'-P-CCNC: 12 U/L (ref 7–45)
ANION GAP SERPL CALC-SCNC: 8 MMOL/L (ref 10–20)
AST SERPL W P-5'-P-CCNC: 17 U/L (ref 9–39)
BASOPHILS # BLD AUTO: 0.07 X10*3/UL (ref 0–0.1)
BASOPHILS NFR BLD AUTO: 0.9 %
BILIRUB SERPL-MCNC: 0.4 MG/DL (ref 0–1.2)
BUN SERPL-MCNC: 9 MG/DL (ref 6–23)
CALCIUM SERPL-MCNC: 8.7 MG/DL (ref 8.6–10.3)
CHLORIDE SERPL-SCNC: 104 MMOL/L (ref 98–107)
CHOLEST SERPL-MCNC: 181 MG/DL (ref 0–199)
CHOLESTEROL/HDL RATIO: 3.8
CO2 SERPL-SCNC: 28 MMOL/L (ref 21–32)
CREAT SERPL-MCNC: 0.7 MG/DL (ref 0.5–1.05)
EOSINOPHIL # BLD AUTO: 0.08 X10*3/UL (ref 0–0.7)
EOSINOPHIL NFR BLD AUTO: 1.1 %
ERYTHROCYTE [DISTWIDTH] IN BLOOD BY AUTOMATED COUNT: 13.3 % (ref 11.5–14.5)
EST. AVERAGE GLUCOSE BLD GHB EST-MCNC: 97 MG/DL
GFR SERPL CREATININE-BSD FRML MDRD: >90 ML/MIN/1.73M*2
GLUCOSE SERPL-MCNC: 80 MG/DL (ref 74–99)
HBA1C MFR BLD: 5 %
HCT VFR BLD AUTO: 42.8 % (ref 36–46)
HDLC SERPL-MCNC: 48.1 MG/DL
HGB BLD-MCNC: 14 G/DL (ref 12–16)
IMM GRANULOCYTES # BLD AUTO: 0.02 X10*3/UL (ref 0–0.7)
IMM GRANULOCYTES NFR BLD AUTO: 0.3 % (ref 0–0.9)
LDLC SERPL CALC-MCNC: 117 MG/DL
LYMPHOCYTES # BLD AUTO: 1.77 X10*3/UL (ref 1.2–4.8)
LYMPHOCYTES NFR BLD AUTO: 23.8 %
MCH RBC QN AUTO: 29.8 PG (ref 26–34)
MCHC RBC AUTO-ENTMCNC: 32.7 G/DL (ref 32–36)
MCV RBC AUTO: 91 FL (ref 80–100)
MONOCYTES # BLD AUTO: 0.69 X10*3/UL (ref 0.1–1)
MONOCYTES NFR BLD AUTO: 9.3 %
NEUTROPHILS # BLD AUTO: 4.81 X10*3/UL (ref 1.2–7.7)
NEUTROPHILS NFR BLD AUTO: 64.6 %
NON HDL CHOLESTEROL: 133 MG/DL (ref 0–149)
NRBC BLD-RTO: 0 /100 WBCS (ref 0–0)
PLATELET # BLD AUTO: 320 X10*3/UL (ref 150–450)
POTASSIUM SERPL-SCNC: 4.4 MMOL/L (ref 3.5–5.3)
PROT SERPL-MCNC: 7.4 G/DL (ref 6.4–8.2)
RBC # BLD AUTO: 4.7 X10*6/UL (ref 4–5.2)
SODIUM SERPL-SCNC: 136 MMOL/L (ref 136–145)
T4 FREE SERPL-MCNC: 0.88 NG/DL (ref 0.61–1.12)
TRIGL SERPL-MCNC: 79 MG/DL (ref 0–149)
TSH SERPL-ACNC: 25.5 MIU/L (ref 0.44–3.98)
VLDL: 16 MG/DL (ref 0–40)
WBC # BLD AUTO: 7.4 X10*3/UL (ref 4.4–11.3)

## 2023-11-03 PROCEDURE — 84439 ASSAY OF FREE THYROXINE: CPT

## 2023-11-03 PROCEDURE — 36415 COLL VENOUS BLD VENIPUNCTURE: CPT

## 2023-11-03 PROCEDURE — 84443 ASSAY THYROID STIM HORMONE: CPT

## 2023-11-03 PROCEDURE — 99214 OFFICE O/P EST MOD 30 MIN: CPT | Performed by: INTERNAL MEDICINE

## 2023-11-03 PROCEDURE — 85025 COMPLETE CBC W/AUTO DIFF WBC: CPT

## 2023-11-03 PROCEDURE — 80061 LIPID PANEL: CPT

## 2023-11-03 PROCEDURE — 80053 COMPREHEN METABOLIC PANEL: CPT

## 2023-11-03 PROCEDURE — 3008F BODY MASS INDEX DOCD: CPT | Performed by: INTERNAL MEDICINE

## 2023-11-03 PROCEDURE — 1036F TOBACCO NON-USER: CPT | Performed by: INTERNAL MEDICINE

## 2023-11-03 PROCEDURE — 83036 HEMOGLOBIN GLYCOSYLATED A1C: CPT

## 2023-11-03 PROCEDURE — 82306 VITAMIN D 25 HYDROXY: CPT

## 2023-11-03 RX ORDER — CLINDAMYCIN PHOSPHATE 11.9 MG/ML
1 SOLUTION TOPICAL 2 TIMES DAILY
COMMUNITY
End: 2024-02-07 | Stop reason: WASHOUT

## 2023-11-03 ASSESSMENT — PATIENT HEALTH QUESTIONNAIRE - PHQ9
1. LITTLE INTEREST OR PLEASURE IN DOING THINGS: NOT AT ALL
SUM OF ALL RESPONSES TO PHQ9 QUESTIONS 1 AND 2: 0
2. FEELING DOWN, DEPRESSED OR HOPELESS: NOT AT ALL

## 2023-11-03 NOTE — PROGRESS NOTES
"Subjective   Patient ID: Nargis Strong is a 27 y.o. female who presents for Anxiety and Weight Loss.    HPI Pt lakshmi pleasant 27 y.o. F who was seen and evaluated during the OV. Pt states taht she concerned about weight gain. Pt states that she gained about 20 lbs for the last 2 months, even she was on healthy diet. PT also states that she has a lot of anxiety due the the stress at school and reports chronic fatigue.   During the clinical encounter pt denies fever, chills, no SOB, no chest pain/tightness, no abdominal pain, no N/V/D reported, no change of urination reported. Pt denies any other health concerns during this visit.  Sohx: reviewed  PMHx and Fhx: reviewed    Review of Systems  Constitutional: no fever, no chills, reports fatigue  Eyes: no eyesight problems, no eye redness, no eye pain, no blurred vision  ENT: no ear pain or sore throat, no nasal discharge or congestion, no sinus pressure, no hearing loss  Cardiovascular: no chest pain or tightness, no SOB, the heart rate was not fast or slow  Respiratory: no cough, no dyspnea with exertion or with rest, no wheezing  Gastrointestinal: no abdominal pain, no constipation or diarrhea, no heartburn, no nausea or vomiting  Genitourinary: no urine frequency, no dysuria, no urinary urgency, no urinary incontinence or retention  Musculoskeletal: no back pain, no arthralgia, no joint swelling or stiffness, no muscle weakness  Integumentary: no skin lesions or rash  Neurological: no headaches, no dizziness or fainting, no limb weakness  Psychiatric: no mood changes, no depression, no suicidal thoughts, but feels anxious  Endocrine: pt reports weight change, no temperature intolerance, no change of appetite  Hematologic/Lymphatic: no easy bruising or bleeding  Objective   /77 (BP Location: Left arm, Patient Position: Sitting)   Pulse 84   Temp 36.3 °C (97.3 °F)   Ht 1.549 m (5' 1\")   Wt 74.8 kg (165 lb)   BMI 31.18 kg/m²     Physical Exam  Constitutional: " well hydrated, well nourished, no acute distress. Vital signs reviewed  Head and face: normocephalic, atraumatic  Eyes: no erythema, edema or visible abnormalities of conjunctiva or lids. Pupils are equal, round and reactive to light. Extraocular movement normal  ENT: external ears without deformities  Neck: full ROM, neck was supple  Pulmonary: normal respiratory rate and effort. Lungs are clear to auscultation, no rales,no rhonchi or wheezing  Cardiovascular: regular rate and rhythm, normal S1 and S2, no murmur, no gallop, posterior tib. pulse normal 2+ bilaterally, no lower extremity edema  Abdomen: soft, non tender on palpation, not distended, normal BS in all 4 quadrants, no CVA tenderness  Musculoskeletal: no joint swelling, normal movements of all 4 extremities. Gait and station: normal, Digits and nails: normal without clubbing  Skin: normal color, no rash  Neurologic: Cranial nerves: CN II: visual acuity intact. Source: acuity reported by patient. Pupils reactive to light with normal accommodation. Right and left pupil normal CN III, IV and VI: the EO movements were intact. CN VIII: no hearing loss. Sensory exam: normal light to touch  Psychiatric: Mood and affect: normal, Alert and Oriented x 3  Lymphatic: no cervical lymphadenopathy    Assessment/Plan   HX of anxiety:  Hx and PE as mentioned  Will check TSH/T4 level  Pt might be beneficial form the SSRI/SNRI Tx    Hx of weight gain, hx of hypothyroidism, on levothyroxine  Will order TSH.T4, will screen for DM and lipid disorder    Fatigue: will order CBC, CMP and Vitamin D level    Hx of seasonal allergy: the referral for the allergist evaluation was provided as per the pts request    Plan was d/c with the pt in details, verbalized understanding, agreed  Please follow up with PCP as planned or sooner if needed

## 2023-11-06 ENCOUNTER — TELEPHONE (OUTPATIENT)
Dept: PRIMARY CARE | Facility: CLINIC | Age: 28
End: 2023-11-06
Payer: COMMERCIAL

## 2023-11-06 NOTE — TELEPHONE ENCOUNTER
----- Message from Lydia Gee MD sent at 11/6/2023  8:16 AM EST -----  Please, inform the pt that the recent BW showed abnormal thyroid gland function, decreased vitamin D. PT should schedule FU OV in 1-2 weeks.  Thank you

## 2023-11-07 DIAGNOSIS — B00.1 HERPES LABIALIS: Primary | ICD-10-CM

## 2023-11-07 RX ORDER — VALACYCLOVIR HYDROCHLORIDE 1 G/1
1000 TABLET, FILM COATED ORAL ONCE
Qty: 1 TABLET | Refills: 0 | Status: SHIPPED | OUTPATIENT
Start: 2023-11-07 | End: 2023-11-07

## 2023-11-08 ENCOUNTER — TELEMEDICINE (OUTPATIENT)
Dept: PRIMARY CARE | Facility: CLINIC | Age: 28
End: 2023-11-08
Payer: COMMERCIAL

## 2023-11-08 ENCOUNTER — TELEPHONE (OUTPATIENT)
Dept: PRIMARY CARE | Facility: CLINIC | Age: 28
End: 2023-11-08

## 2023-11-08 DIAGNOSIS — Z71.2 ENCOUNTER TO DISCUSS TEST RESULTS: Primary | ICD-10-CM

## 2023-11-08 DIAGNOSIS — Z79.899 MEDICATION DOSE INCREASED: ICD-10-CM

## 2023-11-08 DIAGNOSIS — E03.8 OTHER SPECIFIED HYPOTHYROIDISM: ICD-10-CM

## 2023-11-08 DIAGNOSIS — E55.9 VITAMIN D INSUFFICIENCY: ICD-10-CM

## 2023-11-08 PROBLEM — E06.3 HASHIMOTO'S DISEASE: Status: ACTIVE | Noted: 2023-11-08

## 2023-11-08 PROBLEM — R10.9 ABDOMINAL PAIN: Status: ACTIVE | Noted: 2023-03-07

## 2023-11-08 PROCEDURE — 99441 PR PHYS/QHP TELEPHONE EVALUATION 5-10 MIN: CPT | Performed by: INTERNAL MEDICINE

## 2023-11-08 RX ORDER — LEVOTHYROXINE SODIUM 175 UG/1
175 TABLET ORAL
Qty: 30 TABLET | Refills: 11 | Status: SHIPPED | OUTPATIENT
Start: 2023-11-08 | End: 2024-01-15 | Stop reason: WASHOUT

## 2023-11-08 RX ORDER — ERGOCALCIFEROL 1.25 MG/1
50000 CAPSULE ORAL
Qty: 12 CAPSULE | Refills: 0 | Status: SHIPPED | OUTPATIENT
Start: 2023-11-08 | End: 2024-02-07 | Stop reason: SDUPTHER

## 2023-11-08 NOTE — PROGRESS NOTES
Subjective   Patient ID: Nargis Strong is a 27 y.o. female who presents for No chief complaint on file..    HPI Pt is a pleasant 27 y.o. F who was evaluated during the phone call visit. Pt had a recent BW done and would like to discuss the BW results. I shared and discussed the BW results with the pt in details. Pt states that she constantly feels tired. During the clinical encounter pt denies fever, chills, no SOB, no chest pain/tightness, no abdominal pain, no N/V/D reported, no change of urination reported. Pt denies any other health concerns during this visit.  Sohx: reviewed  PMHx and Fhx: reviewed    Review of Systems  Constitutional: reports fatigue  Eyes: no eyesight problems, no eye redness, no eye pain, no blurred vision  ENT: no ear pain or sore throat, no nasal discharge or congestion, no sinus pressure, no hearing loss  Cardiovascular: no chest pain or tightness, no SOB, the heart rate was not fast or slow  Respiratory: no cough, no dyspnea with exertion or with rest, no wheezing  Gastrointestinal: no abdominal pain, no constipation or diarrhea, no heartburn, no nausea or vomiting  Genitourinary: no urine frequency, no dysuria, no urinary urgency, no urinary incontinence or retention  Musculoskeletal: no back pain, no arthralgia, no joint swelling or stiffness, no muscle weakness  Integumentary: no skin lesions or rash  Neurological: no headaches, no dizziness or fainting, no limb weakness  Psychiatric: no suicidal/homicidal thoughts  Endocrine: no weight change, no temperature intolerance, no change of appetite  Hematologic/Lymphatic: no easy bruising or bleeding  Objective   There were no vitals taken for this visit.    Physical Exam  PE was not performed due to the phone setting of this visit, but pt was able to speak in full sentences, alert, oriented, not confused.  Vitals: were not provided      Assessment/Plan   Encounter to discuss the BW results: The results shared and discussed with the pt in  details. Pt verbalized understanding  Hypothyroidism: Pt states that she currently take levothyroxine 150 mcg daily  Will increases the dose of levothyroxine  up to 175 mcg daily and will re check TSH/T4 level in 8-12 week  Vitamin D insufficiency: will start on Vitamin replenishing Tx 50.000 international U weekly for 12 weeks  Pt was instructed to contact PCP if pt will have no improvement of the condition/worsening of the sxs/new sxs on the current treatment    Plan was d/c with the pt in details, verbalized understanding, agreed  Please follow up with PCP as planned or sooner if needed

## 2023-11-08 NOTE — TELEPHONE ENCOUNTER
As we discussed during the VV today pt will need to have thyroid gland function re evaluation test in 6-8 weeks. The orders for the BW test in the EMR

## 2023-11-15 ENCOUNTER — APPOINTMENT (OUTPATIENT)
Dept: PRIMARY CARE | Facility: CLINIC | Age: 28
End: 2023-11-15
Payer: COMMERCIAL

## 2023-11-16 DIAGNOSIS — L70.0 ACNE VULGARIS: ICD-10-CM

## 2023-11-16 RX ORDER — MINOCYCLINE HYDROCHLORIDE 100 MG/1
100 TABLET ORAL NIGHTLY
Qty: 90 TABLET | Refills: 0 | Status: SHIPPED | OUTPATIENT
Start: 2023-11-16 | End: 2024-03-06 | Stop reason: WASHOUT

## 2023-11-30 ENCOUNTER — TELEMEDICINE (OUTPATIENT)
Dept: PRIMARY CARE | Facility: CLINIC | Age: 28
End: 2023-11-30
Payer: COMMERCIAL

## 2023-11-30 DIAGNOSIS — J01.80 ACUTE NON-RECURRENT SINUSITIS OF OTHER SINUS: Primary | ICD-10-CM

## 2023-11-30 DIAGNOSIS — J20.9 ACUTE BRONCHITIS, UNSPECIFIED ORGANISM: ICD-10-CM

## 2023-11-30 PROCEDURE — 99422 OL DIG E/M SVC 11-20 MIN: CPT | Performed by: INTERNAL MEDICINE

## 2023-11-30 RX ORDER — AZITHROMYCIN 250 MG/1
TABLET, FILM COATED ORAL
Qty: 6 TABLET | Refills: 0 | Status: SHIPPED | OUTPATIENT
Start: 2023-11-30 | End: 2023-12-05

## 2023-11-30 NOTE — PROGRESS NOTES
Subjective   Patient ID: Nargis Strong is a 28 y.o. female who presents for URI (Cough, congestion, sinus headache, stuffed nose.  She went to Urgent Care and was negative for Flu and Covid.).    HPI Pt is a pleasant 28 y.o. F who was seen and evaluated during the VV with 5 day hx of congested, runny nose, painful sinuses, fatigue, fever of 100.2F, chills, productive cough for the last 5 days. Pt went to  last night and was tested -ve for COVID/Flu infection. Pt states that she does not feel any better, actually she feels worse. During the clinical encounter pt denies  SOB, no chest pain/tightness, no abdominal pain, no N/V/D reported, no change of urination reported. Pt denies any other health concerns during this visit.  Sohx: reviewed  PMHx and Fhx: reviewed    Review of Systems:  Constitutional: as mentioned at HPI  Eyes: no eyesight problems, no eye redness, no eye pain, no blurred vision  ENT: as mentioned at HPI  Cardiovascular: no chest pain or tightness, no SOB, the heart rate was not fast or slow  Respiratory: pt reports productive cough, no dyspnea with exertion or with rest, no wheezing  Gastrointestinal: no abdominal pain, no constipation or diarrhea, no heartburn, no nausea or vomiting  Genitourinary: no urine frequency, no dysuria, no urinary urgency, no urinary incontinence or retention  Musculoskeletal: no back pain, no arthralgia, no joint swelling or stiffness, no muscle weakness  Integumentary: no skin lesions or rash  Neurological: no headaches, no dizziness or fainting, no limb weakness  Psychiatric: no mood changes, no anxiety or depression, no suicidal thoughts  Endocrine: no weight change, no temperature intolerance, no change of appetite  Hematologic/Lymphatic: no easy bruising or bleeding    Objective   There were no vitals taken for this visit.    Physical Exam  PE was limited due to the virtual settings of the visit  Pt appears comfortable, not in acute distress  Pt is Ax0x3, pleasant,  follows the commands  Skin color is normal, clear conjunctiva  Breathing unlabored  No neck lymphadenopathy noticed   Vitals: pt states that she had no fever this am    Assessment/Plan   Acute sinusitis, Acute bronchitis:  Hx as mentioned above  Will start on 5 day Tx with Azithromycin 250 mg PO daily since this Abx was helpful in the past. Pt denies any chance of pregnancy  Pt was instructed to use tylenol for pain/fever management  Pt was instructed to contact PCP if pt will have no improvement of the condition/worsening of the sxs/new sxs on the current treatment  Pt will need the excuse note until 12/4/2023  Plan was d/c with the pt in details, verbalized understanding, agreed  Please follow up with PCP as planned or sooner if needed

## 2023-12-04 ENCOUNTER — TELEPHONE (OUTPATIENT)
Dept: PRIMARY CARE | Facility: CLINIC | Age: 28
End: 2023-12-04
Payer: COMMERCIAL

## 2023-12-04 DIAGNOSIS — Z87.09 HISTORY OF ASTHMA: Primary | ICD-10-CM

## 2023-12-04 NOTE — TELEPHONE ENCOUNTER
Patient states that she was prescribed the albuterol inhaler for asthma from her former PCP.   She needs a refill sent to [facility] in [place]

## 2023-12-13 ENCOUNTER — APPOINTMENT (OUTPATIENT)
Dept: PRIMARY CARE | Facility: CLINIC | Age: 28
End: 2023-12-13
Payer: COMMERCIAL

## 2023-12-14 ENCOUNTER — APPOINTMENT (OUTPATIENT)
Dept: ALLERGY | Facility: CLINIC | Age: 28
End: 2023-12-14
Payer: COMMERCIAL

## 2023-12-20 ENCOUNTER — APPOINTMENT (OUTPATIENT)
Dept: PRIMARY CARE | Facility: CLINIC | Age: 28
End: 2023-12-20
Payer: COMMERCIAL

## 2024-01-10 ENCOUNTER — LAB (OUTPATIENT)
Dept: LAB | Facility: LAB | Age: 29
End: 2024-01-10
Payer: COMMERCIAL

## 2024-01-10 ENCOUNTER — OFFICE VISIT (OUTPATIENT)
Dept: PRIMARY CARE | Facility: CLINIC | Age: 29
End: 2024-01-10
Payer: COMMERCIAL

## 2024-01-10 VITALS
BODY MASS INDEX: 31.18 KG/M2 | HEART RATE: 82 BPM | DIASTOLIC BLOOD PRESSURE: 76 MMHG | SYSTOLIC BLOOD PRESSURE: 122 MMHG | HEIGHT: 61 IN | TEMPERATURE: 97.5 F

## 2024-01-10 DIAGNOSIS — R53.83 FATIGUE, UNSPECIFIED TYPE: ICD-10-CM

## 2024-01-10 DIAGNOSIS — E03.8 OTHER SPECIFIED HYPOTHYROIDISM: ICD-10-CM

## 2024-01-10 DIAGNOSIS — F41.9 ANXIETY: ICD-10-CM

## 2024-01-10 DIAGNOSIS — Z76.89 ENCOUNTER FOR WEIGHT MANAGEMENT: ICD-10-CM

## 2024-01-10 DIAGNOSIS — R53.83 OTHER FATIGUE: ICD-10-CM

## 2024-01-10 LAB
25(OH)D3 SERPL-MCNC: 51 NG/ML (ref 30–100)
ALBUMIN SERPL BCP-MCNC: 4 G/DL (ref 3.4–5)
ALP SERPL-CCNC: 56 U/L (ref 33–110)
ALT SERPL W P-5'-P-CCNC: 14 U/L (ref 7–45)
ANION GAP SERPL CALC-SCNC: 11 MMOL/L (ref 10–20)
AST SERPL W P-5'-P-CCNC: 17 U/L (ref 9–39)
BASOPHILS # BLD AUTO: 0.05 X10*3/UL (ref 0–0.1)
BASOPHILS NFR BLD AUTO: 0.7 %
BILIRUB SERPL-MCNC: 0.3 MG/DL (ref 0–1.2)
BUN SERPL-MCNC: 10 MG/DL (ref 6–23)
CALCIUM SERPL-MCNC: 8.3 MG/DL (ref 8.6–10.3)
CHLORIDE SERPL-SCNC: 103 MMOL/L (ref 98–107)
CHOLEST SERPL-MCNC: 156 MG/DL (ref 0–199)
CHOLESTEROL/HDL RATIO: 3.4
CO2 SERPL-SCNC: 27 MMOL/L (ref 21–32)
CREAT SERPL-MCNC: 0.71 MG/DL (ref 0.5–1.05)
EGFRCR SERPLBLD CKD-EPI 2021: >90 ML/MIN/1.73M*2
EOSINOPHIL # BLD AUTO: 0.05 X10*3/UL (ref 0–0.7)
EOSINOPHIL NFR BLD AUTO: 0.7 %
ERYTHROCYTE [DISTWIDTH] IN BLOOD BY AUTOMATED COUNT: 12.8 % (ref 11.5–14.5)
FERRITIN SERPL-MCNC: 111 NG/ML (ref 8–150)
GLUCOSE SERPL-MCNC: 85 MG/DL (ref 74–99)
HCT VFR BLD AUTO: 40.4 % (ref 36–46)
HDLC SERPL-MCNC: 45.9 MG/DL
HGB BLD-MCNC: 13.1 G/DL (ref 12–16)
IMM GRANULOCYTES # BLD AUTO: 0.02 X10*3/UL (ref 0–0.7)
IMM GRANULOCYTES NFR BLD AUTO: 0.3 % (ref 0–0.9)
LDLC SERPL CALC-MCNC: 89 MG/DL
LYMPHOCYTES # BLD AUTO: 2.09 X10*3/UL (ref 1.2–4.8)
LYMPHOCYTES NFR BLD AUTO: 31 %
MCH RBC QN AUTO: 29 PG (ref 26–34)
MCHC RBC AUTO-ENTMCNC: 32.4 G/DL (ref 32–36)
MCV RBC AUTO: 89 FL (ref 80–100)
MONOCYTES # BLD AUTO: 0.64 X10*3/UL (ref 0.1–1)
MONOCYTES NFR BLD AUTO: 9.5 %
NEUTROPHILS # BLD AUTO: 3.89 X10*3/UL (ref 1.2–7.7)
NEUTROPHILS NFR BLD AUTO: 57.8 %
NON HDL CHOLESTEROL: 110 MG/DL (ref 0–149)
NRBC BLD-RTO: 0 /100 WBCS (ref 0–0)
PLATELET # BLD AUTO: 322 X10*3/UL (ref 150–450)
POTASSIUM SERPL-SCNC: 4.1 MMOL/L (ref 3.5–5.3)
PROT SERPL-MCNC: 7.2 G/DL (ref 6.4–8.2)
RBC # BLD AUTO: 4.52 X10*6/UL (ref 4–5.2)
SODIUM SERPL-SCNC: 137 MMOL/L (ref 136–145)
T3FREE SERPL-MCNC: 3.6 PG/ML (ref 2.3–4.2)
T4 FREE SERPL-MCNC: 1.36 NG/DL (ref 0.61–1.12)
TRIGL SERPL-MCNC: 107 MG/DL (ref 0–149)
TSH SERPL-ACNC: 0.15 MIU/L (ref 0.44–3.98)
VLDL: 21 MG/DL (ref 0–40)
WBC # BLD AUTO: 6.7 X10*3/UL (ref 4.4–11.3)

## 2024-01-10 PROCEDURE — 84481 FREE ASSAY (FT-3): CPT

## 2024-01-10 PROCEDURE — 80053 COMPREHEN METABOLIC PANEL: CPT

## 2024-01-10 PROCEDURE — 82728 ASSAY OF FERRITIN: CPT

## 2024-01-10 PROCEDURE — 85025 COMPLETE CBC W/AUTO DIFF WBC: CPT

## 2024-01-10 PROCEDURE — 36415 COLL VENOUS BLD VENIPUNCTURE: CPT

## 2024-01-10 PROCEDURE — 84443 ASSAY THYROID STIM HORMONE: CPT

## 2024-01-10 PROCEDURE — 84439 ASSAY OF FREE THYROXINE: CPT

## 2024-01-10 PROCEDURE — 3008F BODY MASS INDEX DOCD: CPT | Performed by: INTERNAL MEDICINE

## 2024-01-10 PROCEDURE — 82306 VITAMIN D 25 HYDROXY: CPT

## 2024-01-10 PROCEDURE — 1036F TOBACCO NON-USER: CPT | Performed by: INTERNAL MEDICINE

## 2024-01-10 PROCEDURE — 80061 LIPID PANEL: CPT

## 2024-01-10 PROCEDURE — 99214 OFFICE O/P EST MOD 30 MIN: CPT | Performed by: INTERNAL MEDICINE

## 2024-01-10 RX ORDER — NICOTINE POLACRILEX 2 MG
GUM BUCCAL
COMMUNITY

## 2024-01-10 RX ORDER — ISOPROPYL ALCOHOL 70 ML/100ML
1 SWAB TOPICAL
Qty: 100 EACH | Refills: 0 | Status: SHIPPED | OUTPATIENT
Start: 2024-01-10 | End: 2024-03-27 | Stop reason: WASHOUT

## 2024-01-10 ASSESSMENT — PATIENT HEALTH QUESTIONNAIRE - PHQ9
SUM OF ALL RESPONSES TO PHQ9 QUESTIONS 1 AND 2: 0
1. LITTLE INTEREST OR PLEASURE IN DOING THINGS: NOT AT ALL
2. FEELING DOWN, DEPRESSED OR HOPELESS: NOT AT ALL

## 2024-01-10 NOTE — PROGRESS NOTES
Subjective   Patient ID: Nargis Strong is a 28 y.o. female who presents for Follow-up (Pt states she has a few concerns to discuss ).    HPI Pt is a pleasant 28 y.o. F who was seen and evaluated during the OV. 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 Pt states that she takes levothyroxine as prescribed and will have BW later today. Pt states that despite of aggressive lifestyle modifications for the last 2 months she was not able to loose weight. Pt is very interested in pharmacological Tx option to help with the weight issue. During the clinical encounter pt denies fever, chills, no SOB, no chest pain/tightness, no abdominal pain, no N/V/D reported, no change of urination reported. Pt denies any other health concerns during this visit.  Sohx: reviewed  PMHx and Fhx: reviewed    Review of Systems  Constitutional: no fever, no chills, but as mentioned at HPI  Eyes: no eyesight problems, no eye redness, no eye pain, no blurred vision  ENT: no ear pain or sore throat, no nasal discharge or congestion, no sinus pressure, no hearing loss  Cardiovascular: no chest pain or tightness, no SOB, the heart rate was not fast or slow  Respiratory: no cough, no dyspnea with exertion or with rest, no wheezing  Gastrointestinal: no abdominal pain, no constipation or diarrhea, no heartburn, no nausea or vomiting  Genitourinary: no urine frequency, no dysuria, no urinary urgency, no urinary incontinence or retention  Musculoskeletal: no back pain, no arthralgia, no joint swelling or stiffness, no muscle weakness  Integumentary: no skin lesions or rash  Neurological: no headaches, no dizziness or fainting, no limb weakness  Psychiatric: no mood changes, no anxiety or depression, no suicidal thoughts  Endocrine:  no temperature intolerance, no change of appetite, unable to loose weight  Hematologic/Lymphatic: no easy bruising or bleeding  Objective   /76 (BP Location: Left  "arm, Patient Position: Sitting)   Pulse 82   Temp 36.4 °C (97.5 °F)   Ht 1.549 m (5' 1\")   BMI 31.18 kg/m²     Physical Exam  Constitutional: well hydrated, well nourished, no acute distress. Vital signs reviewed  Head and face: normocephalic, atraumatic  Eyes: no erythema, edema or visible abnormalities of conjunctiva or lids. Pupils are equal, round and reactive to light. Extraocular movement normal  ENT: external ears without deformities  Neck: full ROM, no adenopathy, neck was supple, thyroid gland not enlarged, no palpable nodules  Pulmonary: normal respiratory rate and effort. Lungs are clear to auscultation, no rales,no rhonchi or wheezing  Cardiovascular: regular rate and rhythm, normal S1 and S2, no murmur, no gallop, posterior tib. pulse normal 2+ bilaterally, no lower extremity edema  Abdomen: soft, non tender on palpation, not distended, normal BS in all 4 quadrants,  Musculoskeletal: no joint swelling, normal movements of all 4 extremities. Gait and station: normal, Digits and nails: normal without clubbing  Skin: normal color, no rash  Neurologic: Cranial nerves: CN II: visual acuity intact. Source: acuity reported by patient. Pupils reactive to light with normal accommodation. Right and left pupil normal CN III, IV and VI: the EO movements were intact. CN VIII: no hearing loss. Sensory exam: normal light to touch  Psychiatric: Mood and affect: normal, Alert and Oriented x 3  Lymphatic: no cervical lymphadenopathy    Assessment/Plan   BMI of 31. Consistent with Obesity WHO class I:  Hx and PE as mentioned  The available pharmacological Tx options were discussed with the pt in details. Pt will start on the lowest dose of semaglutide 0.25 mg subcutaneous weekly. Pt denies any Fhx of MEN II syndrome.  Pt was educated about the possible SE and about the self injection technique. Pt denies any possibilities of the pregnancy  Will order semaglitide 0.25 mg weekly. Alcohol pads were ordered as well  Plan " was d/c with the pt in details, verbalized understanding, agreed    Hypothyroidism: currently on Levothyroxine 175 mcg daily. TSH/T4/T3 to be done later today  Hx of chronic fatigue: on vitamin D 50.ooo In Un weekly   Please follow up with PCP as planned or sooner if needed

## 2024-01-11 RX ORDER — LEVOTHYROXINE SODIUM 150 UG/1
150 TABLET ORAL DAILY
Qty: 30 TABLET | Refills: 0 | Status: CANCELLED | OUTPATIENT
Start: 2024-01-11 | End: 2025-01-10

## 2024-01-15 ENCOUNTER — OFFICE VISIT (OUTPATIENT)
Dept: PRIMARY CARE | Facility: CLINIC | Age: 29
End: 2024-01-15
Payer: COMMERCIAL

## 2024-01-15 VITALS
BODY MASS INDEX: 31.34 KG/M2 | DIASTOLIC BLOOD PRESSURE: 75 MMHG | HEART RATE: 90 BPM | HEIGHT: 61 IN | TEMPERATURE: 97 F | SYSTOLIC BLOOD PRESSURE: 132 MMHG | WEIGHT: 166 LBS

## 2024-01-15 DIAGNOSIS — J45.20 MILD INTERMITTENT ASTHMA, UNSPECIFIED WHETHER COMPLICATED (HHS-HCC): ICD-10-CM

## 2024-01-15 DIAGNOSIS — Z79.899 CONTROLLED SUBSTANCE AGREEMENT SIGNED: ICD-10-CM

## 2024-01-15 DIAGNOSIS — R79.89 ABNORMAL TSH: ICD-10-CM

## 2024-01-15 DIAGNOSIS — E03.8 HYPOTHYROIDISM DUE TO HASHIMOTO'S THYROIDITIS: Primary | ICD-10-CM

## 2024-01-15 DIAGNOSIS — E66.9 OBESITY (BMI 30.0-34.9): ICD-10-CM

## 2024-01-15 DIAGNOSIS — Z71.2 ENCOUNTER TO DISCUSS TEST RESULTS: ICD-10-CM

## 2024-01-15 DIAGNOSIS — E06.3 HYPOTHYROIDISM DUE TO HASHIMOTO'S THYROIDITIS: Primary | ICD-10-CM

## 2024-01-15 DIAGNOSIS — Z79.899 MEDICATION DOSE CHANGED: ICD-10-CM

## 2024-01-15 DIAGNOSIS — Z76.89 ENCOUNTER FOR WEIGHT MANAGEMENT: ICD-10-CM

## 2024-01-15 PROBLEM — E66.811 OBESITY (BMI 30.0-34.9): Status: ACTIVE | Noted: 2024-01-15

## 2024-01-15 PROBLEM — J45.909 ASTHMA (HHS-HCC): Status: RESOLVED | Noted: 2023-03-07 | Resolved: 2024-01-15

## 2024-01-15 LAB
AMPHETAMINES UR QL SCN: NORMAL
BARBITURATES UR QL SCN: NORMAL
BENZODIAZ UR QL SCN: NORMAL
BZE UR QL SCN: NORMAL
CANNABINOIDS UR QL SCN: NORMAL
FENTANYL+NORFENTANYL UR QL SCN: NORMAL
OPIATES UR QL SCN: NORMAL
OXYCODONE+OXYMORPHONE UR QL SCN: NORMAL
PCP UR QL SCN: NORMAL
PREGNANCY TEST URINE, POC: NEGATIVE

## 2024-01-15 PROCEDURE — 3008F BODY MASS INDEX DOCD: CPT | Performed by: INTERNAL MEDICINE

## 2024-01-15 PROCEDURE — 81025 URINE PREGNANCY TEST: CPT | Performed by: INTERNAL MEDICINE

## 2024-01-15 PROCEDURE — 99215 OFFICE O/P EST HI 40 MIN: CPT | Performed by: INTERNAL MEDICINE

## 2024-01-15 PROCEDURE — 1036F TOBACCO NON-USER: CPT | Performed by: INTERNAL MEDICINE

## 2024-01-15 PROCEDURE — 80307 DRUG TEST PRSMV CHEM ANLYZR: CPT

## 2024-01-15 RX ORDER — LEVOTHYROXINE SODIUM 150 UG/1
150 TABLET ORAL
Qty: 30 TABLET | Refills: 11 | Status: SHIPPED | OUTPATIENT
Start: 2024-01-15 | End: 2024-03-11 | Stop reason: SDUPTHER

## 2024-01-15 RX ORDER — PHENTERMINE AND TOPIRAMATE 3.75; 23 MG/1; MG/1
1 CAPSULE, EXTENDED RELEASE ORAL DAILY
Qty: 14 CAPSULE | Refills: 0 | Status: SHIPPED | OUTPATIENT
Start: 2024-01-15 | End: 2024-02-07 | Stop reason: SDUPTHER

## 2024-01-15 ASSESSMENT — PATIENT HEALTH QUESTIONNAIRE - PHQ9
2. FEELING DOWN, DEPRESSED OR HOPELESS: NOT AT ALL
1. LITTLE INTEREST OR PLEASURE IN DOING THINGS: NOT AT ALL
SUM OF ALL RESPONSES TO PHQ9 QUESTIONS 1 AND 2: 0

## 2024-01-15 NOTE — PROGRESS NOTES
"Subjective   Patient ID: Nargis Strong is a 28 y.o. female who presents for Follow-up (Patient states ozempic was not covered on her insurance) and Results.    HPI Pt is a pleasant 28 y.o. F who was seen and evaluated during the OV for the weight loss management and to discuss the recent BW results. Pt was started on Ozempic for the weight loss purpose, but insurance did not cover the costs. Pt would like to try the alternative option with Qsymia. Pt also had a recent BW done and TSH level was abnormal. During the clinical encounter pt denies fever, chills, no SOB, no chest pain/tightness, no abdominal pain, no N/V/D reported, no change of urination reported. Pt denies any other health concerns during this visit.  Sohx: reviewed  PMHx and Fhx: reviewed    Review of Systems  Constitutional: no fever, no chills  Eyes: no eyesight problems, no eye redness, no eye pain, no blurred vision  ENT: no ear pain or sore throat, no nasal discharge or congestion, no sinus pressure, no hearing loss  Cardiovascular: no chest pain or tightness, no SOB, the heart rate was not fast or slow  Respiratory: no cough, no dyspnea with exertion or with rest, no wheezing  Gastrointestinal: no abdominal pain, no constipation or diarrhea, no heartburn, no nausea or vomiting  Genitourinary: no urine frequency, no dysuria, no urinary urgency, no urinary incontinence or retention  Musculoskeletal: no back pain, no arthralgia, no joint swelling or stiffness, no muscle weakness  Integumentary: no skin lesions or rash  Neurological: no headaches, no dizziness or fainting, no limb weakness  Psychiatric: no mood changes, no anxiety or depression, no suicidal thoughts  Endocrine: no weight change, no temperature intolerance, no change of appetite  Hematologic/Lymphatic: no easy bruising or bleeding  Objective   /75 (BP Location: Right arm, Patient Position: Sitting)   Pulse 90   Temp 36.1 °C (97 °F)   Ht 1.549 m (5' 1\")   Wt 75.3 kg (166 lb)   " BMI 31.37 kg/m²     Physical Exam  Constitutional: well hydrated, well nourished, no acute distress. Vital signs reviewed  Head and face: normocephalic, atraumatic  Eyes: no erythema, edema or visible abnormalities of conjunctiva or lids. Pupils are equal, round and reactive to light. Extraocular movement normal  ENT: external ears without deformities  Neck: full ROM, no adenopathy, neck was supple  Pulmonary: normal respiratory rate and effort. Lungs are clear to auscultation, no rales,no rhonchi or wheezing  Cardiovascular: regular rate and rhythm, normal S1 and S2, no murmur, no gallop, posterior tib. pulse normal 2+ bilaterally, no lower extremity edema  Abdomen: soft, non tender on palpation, not distended, normal BS in all 4 quadrants  Musculoskeletal: no joint swelling, normal movements of all 4 extremities. Gait and station: normal, Digits and nails: normal without clubbing  Skin: normal color, no rash  Neurologic: Cranial nerves: CN II: visual acuity intact. Source: acuity reported by patient. Pupils reactive to light with normal accommodation. Right and left pupil normal CN III, IV and VI: the EO movements were intact. CN VIII: no hearing loss. Deep tendon reflexes: were 2+ and symmetric:R and L patella.  Sensory exam: normal light to touch  Psychiatric: Mood and affect: normal, Alert and Oriented x 3  Lymphatic: no cervical lymphadenopathy    Assessment/Plan   Encounter to review the recent BW results:  TSH level is abnormal  Will decreased the dose of Levothyroxine down to 150 mcg daily and repeat BW in 8 weeks    Encounter for the weight management:  BMI of 31, consistent with Obesity WHO class I  Pt would like to try Qsymia for the weight loss. The possible SE and adverse reactions were discus;sed with the pt in details. Pt verbalized understanding.  OARRS report was checked  U pregnancy test was -ve  Utox screen was ordered  Controlled medication agreement was signed by the pt and PCP  Will start on  Phentermine /Topiramate  3.75 mg/23 mg capsule once each morning for the first 2 weeks, yessenia wii increased the dose 7.5 mg/46 mg capsule each morning.  Pt was instructed to contact PCP if pt will have no improvement of the condition/worsening of the sxs/new sxs on the current treatment  Plan was d/c with the pt in details, verbalized understanding, agreed  Please follow up with PCP as planned or sooner if needed

## 2024-01-26 ENCOUNTER — APPOINTMENT (OUTPATIENT)
Dept: ALLERGY | Facility: CLINIC | Age: 29
End: 2024-01-26
Payer: COMMERCIAL

## 2024-01-31 ENCOUNTER — APPOINTMENT (OUTPATIENT)
Dept: PRIMARY CARE | Facility: CLINIC | Age: 29
End: 2024-01-31
Payer: COMMERCIAL

## 2024-02-01 ENCOUNTER — APPOINTMENT (OUTPATIENT)
Dept: PRIMARY CARE | Facility: CLINIC | Age: 29
End: 2024-02-01
Payer: COMMERCIAL

## 2024-02-02 ENCOUNTER — APPOINTMENT (OUTPATIENT)
Dept: ALLERGY | Facility: CLINIC | Age: 29
End: 2024-02-02
Payer: COMMERCIAL

## 2024-02-07 ENCOUNTER — APPOINTMENT (OUTPATIENT)
Dept: PRIMARY CARE | Facility: CLINIC | Age: 29
End: 2024-02-07
Payer: COMMERCIAL

## 2024-02-07 ENCOUNTER — TELEMEDICINE (OUTPATIENT)
Dept: PRIMARY CARE | Facility: CLINIC | Age: 29
End: 2024-02-07
Payer: COMMERCIAL

## 2024-02-07 DIAGNOSIS — Z86.16 HISTORY OF COVID-19: Primary | ICD-10-CM

## 2024-02-07 DIAGNOSIS — E66.9 OBESITY (BMI 30.0-34.9): ICD-10-CM

## 2024-02-07 DIAGNOSIS — Z76.0 ENCOUNTER FOR MEDICATION REFILL: ICD-10-CM

## 2024-02-07 DIAGNOSIS — E55.9 VITAMIN D INSUFFICIENCY: ICD-10-CM

## 2024-02-07 DIAGNOSIS — Z76.89 ENCOUNTER FOR WEIGHT MANAGEMENT: ICD-10-CM

## 2024-02-07 PROCEDURE — 3008F BODY MASS INDEX DOCD: CPT | Performed by: INTERNAL MEDICINE

## 2024-02-07 PROCEDURE — 1036F TOBACCO NON-USER: CPT | Performed by: INTERNAL MEDICINE

## 2024-02-07 PROCEDURE — 99422 OL DIG E/M SVC 11-20 MIN: CPT | Performed by: INTERNAL MEDICINE

## 2024-02-07 RX ORDER — ERGOCALCIFEROL 1.25 MG/1
50000 CAPSULE ORAL
Qty: 12 CAPSULE | Refills: 0 | Status: SHIPPED | OUTPATIENT
Start: 2024-02-07 | End: 2024-02-07 | Stop reason: SDUPTHER

## 2024-02-07 RX ORDER — PHENTERMINE AND TOPIRAMATE 3.75; 23 MG/1; MG/1
1 CAPSULE, EXTENDED RELEASE ORAL DAILY
Qty: 14 CAPSULE | Refills: 0 | Status: SHIPPED | OUTPATIENT
Start: 2024-02-07 | End: 2024-03-06 | Stop reason: WASHOUT

## 2024-02-07 RX ORDER — ERGOCALCIFEROL 1.25 MG/1
50000 CAPSULE ORAL
Qty: 12 CAPSULE | Refills: 0 | Status: SHIPPED | OUTPATIENT
Start: 2024-02-07 | End: 2024-04-03 | Stop reason: SDUPTHER

## 2024-02-07 NOTE — PROGRESS NOTES
Subjective   Patient ID: Nargis Strong is a 28 y.o. female who presents for Follow-up (Follow up. ).    HPI Pt is a pleasant 28 y.o. F who was seen and evaluated during the VV. Pt states that she had COVID infection last week and recently completed the Paxlovid Tx course. Pt states that she started Qsymia low dose Tx and would like to remain on the lowest dose for another 2 weeks. Pt did not report any active SE of this medication. However, pt had the episodes of headaches, but pt was not sure if this pain is from the COVID infection or from the Qsymia. During the clinical encounter pt denies fever, chills, no SOB, no chest pain/tightness, no abdominal pain, no N/V/D reported, no change of urination reported. Pt denies any chance of pregnancy. Pt denies any other health concerns during this visit.  Sohx: reviewed  PMHx and Fhx: reviewed      Review of Systems  Constitutional: no fever, no chills  Eyes: no eyesight problems, no eye redness, no eye pain, no blurred vision  ENT: no ear pain or sore throat, no nasal discharge or congestion, no sinus pressure, no hearing loss  Cardiovascular: no chest pain or tightness, no SOB, the heart rate was not fast or slow  Respiratory: no cough, no dyspnea with exertion or with rest, no wheezing  Gastrointestinal: no abdominal pain, no constipation or diarrhea, no heartburn, no nausea or vomiting  Genitourinary: no urine frequency, no dysuria, no urinary urgency, no urinary incontinence or retention  Musculoskeletal: no back pain, no arthralgia, no joint swelling or stiffness, no muscle weakness  Integumentary: no skin lesions or rash  Neurological: no dizziness or fainting, no limb weakness, but as mentioned at HPI  Psychiatric: no mood changes, no anxiety or depression, no suicidal thoughts  Endocrine: no weight change, no temperature intolerance, no change of appetite  Hematologic/Lymphatic: no easy bruising or bleeding  Objective   There were no vitals taken for this  visit.    Physical Exam  PE was limited due to the virtual settings of the visit  Pt appears comfortable, not in acute distress  Pt is Ax0x3, pleasant, follows the commands  Skin color is normal, clear conjunctiva  Breathing unlabored  No neck lymphadenopathy noticed   Vitals: were not provided    Assessment/Plan   Hx of COVID: pt reports the resolution of the sxs  Encounter for the weight management, Hx of obesity WHO class I:   OARRS report was checked   Another prescription for Qsymia 3.75/23 mg PO caps daily x 14 days was provided  Pt has the hx of vit D deficiency, last BW showed vitamin D level WNL. Pt requested the refill of vitamin D. Pt should take 50.000 In Un of vit D every 2 weeks, refill was provided  Plan was d/c with the pt in details, verbalized understanding, agreed  Please follow up with PCP as planned or sooner if needed    An interactive audio and video telecommunication system which permits real time communications between the patient (at the originating site) and provider (at the distant site) was utilized to provide this telehealth service.  Verbal consent was requested and obtained from the patient Nargis Strong during the telehealth visit.

## 2024-02-29 DIAGNOSIS — E06.3 HYPOTHYROIDISM DUE TO HASHIMOTO'S THYROIDITIS: ICD-10-CM

## 2024-02-29 DIAGNOSIS — R79.89 ABNORMAL TSH: ICD-10-CM

## 2024-02-29 DIAGNOSIS — E03.8 HYPOTHYROIDISM DUE TO HASHIMOTO'S THYROIDITIS: ICD-10-CM

## 2024-02-29 DIAGNOSIS — B00.1 HERPES LABIALIS: Primary | ICD-10-CM

## 2024-02-29 DIAGNOSIS — B00.1 HERPES LABIALIS: ICD-10-CM

## 2024-02-29 RX ORDER — VALACYCLOVIR HYDROCHLORIDE 1 G/1
1000 TABLET, FILM COATED ORAL 2 TIMES DAILY
Qty: 20 TABLET | Refills: 0 | Status: SHIPPED | OUTPATIENT
Start: 2024-02-29 | End: 2024-03-10

## 2024-02-29 RX ORDER — VALACYCLOVIR HYDROCHLORIDE 1 G/1
1000 TABLET, FILM COATED ORAL 2 TIMES DAILY
COMMUNITY
End: 2024-02-29 | Stop reason: SDUPTHER

## 2024-02-29 RX ORDER — VALACYCLOVIR HYDROCHLORIDE 1 G/1
1000 TABLET, FILM COATED ORAL 2 TIMES DAILY
Qty: 30 TABLET | Refills: 0 | Status: CANCELLED | OUTPATIENT
Start: 2024-02-29

## 2024-02-29 RX ORDER — VALACYCLOVIR HYDROCHLORIDE 1 G/1
1000 TABLET, FILM COATED ORAL 2 TIMES DAILY
Qty: 20 TABLET | Refills: 0 | Status: SHIPPED | OUTPATIENT
Start: 2024-02-29 | End: 2024-02-29 | Stop reason: SDUPTHER

## 2024-02-29 NOTE — TELEPHONE ENCOUNTER
Pt calling a second time for her valtrex rx be sent to [facility]. Can this be done?

## 2024-02-29 NOTE — TELEPHONE ENCOUNTER
Rx Refill Request Telephone Encounter  *NEW Pharmacy*    Name:  Nargis Strong  :  [id number]  Medication Name:  valtrex (cold sore medication not yet prescribed by Dr. Gee, prescribed by prior PCP)  Specific Pharmacy location:  [facility]  Date of last appointment:  24  Date of next appointment:    Best number to reach patient:

## 2024-03-06 ENCOUNTER — TELEMEDICINE (OUTPATIENT)
Dept: PRIMARY CARE | Facility: CLINIC | Age: 29
End: 2024-03-06
Payer: COMMERCIAL

## 2024-03-06 DIAGNOSIS — J06.9 UPPER RESPIRATORY TRACT INFECTION, UNSPECIFIED TYPE: Primary | ICD-10-CM

## 2024-03-06 PROBLEM — S93.409A SPRAIN OF ANKLE: Status: ACTIVE | Noted: 2024-03-06

## 2024-03-06 PROBLEM — J30.2 SEASONAL ALLERGIES: Status: ACTIVE | Noted: 2024-03-06

## 2024-03-06 PROBLEM — G43.909 MIGRAINE HEADACHE: Status: ACTIVE | Noted: 2023-08-09

## 2024-03-06 PROBLEM — Z86.2 HISTORY OF ANEMIA: Status: ACTIVE | Noted: 2024-03-06

## 2024-03-06 PROBLEM — E55.9 VITAMIN D DEFICIENCY: Status: ACTIVE | Noted: 2023-11-08

## 2024-03-06 PROBLEM — M54.2 NECK PAIN: Status: ACTIVE | Noted: 2024-03-06

## 2024-03-06 PROBLEM — M79.661 PAIN OF RIGHT LOWER LEG: Status: ACTIVE | Noted: 2024-03-06

## 2024-03-06 PROBLEM — K59.09 CHRONIC CONSTIPATION: Status: ACTIVE | Noted: 2024-03-06

## 2024-03-06 PROBLEM — E66.9 OBESITY: Status: ACTIVE | Noted: 2024-03-06

## 2024-03-06 PROBLEM — M25.579 ANKLE PAIN: Status: ACTIVE | Noted: 2024-03-06

## 2024-03-06 PROBLEM — R51.9 HEADACHE: Status: ACTIVE | Noted: 2024-03-06

## 2024-03-06 PROBLEM — L70.9 ACNE: Status: ACTIVE | Noted: 2024-03-06

## 2024-03-06 PROBLEM — K92.1 HEMATOCHEZIA: Status: ACTIVE | Noted: 2023-03-07

## 2024-03-06 PROBLEM — J30.9 ALLERGIC RHINITIS: Status: ACTIVE | Noted: 2024-03-06

## 2024-03-06 PROBLEM — E66.9 OBESITY WITH BODY MASS INDEX 30 OR GREATER: Status: ACTIVE | Noted: 2023-11-03

## 2024-03-06 PROBLEM — J20.9 ACUTE BRONCHITIS: Status: ACTIVE | Noted: 2024-03-06

## 2024-03-06 PROBLEM — R10.30 LOWER ABDOMINAL PAIN: Status: ACTIVE | Noted: 2023-03-07
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
## 2024-03-06 PROBLEM — R42 DIZZINESS: Status: ACTIVE | Noted: 2023-08-09

## 2024-03-06 PROBLEM — S06.0XAA BRAIN CONCUSSION: Status: ACTIVE | Noted: 2024-03-06

## 2024-03-06 PROCEDURE — 3008F BODY MASS INDEX DOCD: CPT | Performed by: INTERNAL MEDICINE

## 2024-03-06 PROCEDURE — 99441 PR PHYS/QHP TELEPHONE EVALUATION 5-10 MIN: CPT | Performed by: INTERNAL MEDICINE

## 2024-03-06 PROCEDURE — 1036F TOBACCO NON-USER: CPT | Performed by: INTERNAL MEDICINE

## 2024-03-06 RX ORDER — AMOXICILLIN AND CLAVULANATE POTASSIUM 500; 125 MG/1; MG/1
500 TABLET, FILM COATED ORAL 3 TIMES DAILY
Qty: 30 TABLET | Refills: 0 | Status: SHIPPED | OUTPATIENT
Start: 2024-03-06 | End: 2024-03-16

## 2024-03-06 NOTE — PROGRESS NOTES
Assessment/Plan   Problem List Items Addressed This Visit    None  Visit Diagnoses       Upper respiratory tract infection, unspecified type    -  Primary    Relevant Medications    amoxicillin-pot clavulanate (Augmentin) 500-125 mg tablet            Subjective   Patient ID: Nargis Strong is a 28 y.o. female who presents for Sinusitis.    History reviewed. No pertinent surgical history.   Family History   Problem Relation Name Age of Onset    Crohn's disease Mother      Thyroid disease Mother      Diabetes Father      Hyperlipidemia Father      Diabetes Other GRANDMOTHER     Hypertension Other GRANDMOTHER     Thyroid disease Other GRANDMOTHER     Other (CARDIAC DISO) Other GRANDFATHER     Diabetes Other GRANDFATHER     Other (MALIGNANT NEOPLASM OF COLON) Other GRANDFATHER     Other (MALIGNANT NEOPLASM OF THYROID) Other GRANDFATHER       Social History     Socioeconomic History    Marital status:      Spouse name: Not on file    Number of children: Not on file    Years of education: Not on file    Highest education level: Not on file   Occupational History    Not on file   Tobacco Use    Smoking status: Never    Smokeless tobacco: Never   Vaping Use    Vaping Use: Former   Substance and Sexual Activity    Alcohol use: Not Currently    Drug use: Not Currently     Types: Marijuana    Sexual activity: Not on file   Other Topics Concern    Not on file   Social History Narrative    Not on file     Social Determinants of Health     Financial Resource Strain: Not on file   Food Insecurity: Not on file   Transportation Needs: Not on file   Physical Activity: Not on file   Stress: Not on file   Social Connections: Not on file   Intimate Partner Violence: Not on file   Housing Stability: Not on file      Seasonale (91) [levonorgestrel-ethinyl estrad]   Current Outpatient Medications   Medication Sig Dispense Refill    albuterol 90 mcg/actuation inhaler Inhale 2 puffs into the lungs every 6 hours if needed for wheezing 8.5  g 0    albuterol sulfate (Proair Digihaler) 90 mcg/actuation aero powdr breath act w/sensor inhaler Inhale 2 puffs every 6 hours if needed for wheezing. 1 each 1    alcohol swabs (Alcohol Pads) pads, medicated Apply 1 Swab topically 1 (one) time per week. 100 each 0    amoxicillin-pot clavulanate (Augmentin) 500-125 mg tablet Take 1 tablet (500 mg) by mouth 3 times a day for 10 days. 30 tablet 0    ascorbic acid (Vitamin C) 1,000 mg tablet Take 1 tablet (1,000 mg) by mouth once daily.      biotin 1 mg capsule Take by mouth.      cholecalciferol (Vitamin D-3) 1,250 mcg (50,000 unit) capsule Take 1 capsule (50,000 Units) by mouth 1 (one) time per week.      ergocalciferol (Vitamin D-2) 1.25 MG (78164 UT) capsule Take 1 capsule (50,000 Units) by mouth every 14 (fourteen) days. 12 capsule 0    ferrous sulfate 325 (65 Fe) MG tablet Take 1 tablet by mouth once daily with breakfast.      levothyroxine (Synthroid) 150 mcg tablet Take 1 tablet (150 mcg) by mouth once daily in the morning. Take before meals. 30 tablet 11    loratadine (Claritin) 10 mg tablet Take 1 tablet (10 mg) by mouth once daily.      magnesium oxide (Mag-Ox) 400 mg (241.3 mg magnesium) tablet Take 1 tablet (400 mg) by mouth once daily. Taking 350mg now      valACYclovir (Valtrex) 1 gram tablet Take 1 tablet (1,000 mg) by mouth 2 times a day for 10 days. 20 tablet 0     No current facility-administered medications for this visit.      There were no vitals filed for this visit.   Problem List Items Addressed This Visit    None  Visit Diagnoses       Upper respiratory tract infection, unspecified type    -  Primary    Relevant Medications    amoxicillin-pot clavulanate (Augmentin) 500-125 mg tablet           No orders of the defined types were placed in this encounter.       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 having for last 4 days cough yellow phlegm sinus infection feeling not so well but afebrile    ROS otherwise negative  She is not having any wheezing or asthma attack at this point  Past medical history reviewed  Social and family history reviewed  Allergies and medications reviewed  Recent labs reviewed  Vital signs not done    PHYSICAL EXAM  Examination limited by telephonic conversation she was awake alert orientated no acute distress comfortable not coughing during talking she was not short of breath  Discussed the management  Discussed the antibiotic  Total time on the telephone 6 minutes    No results found for: \"PR1\", \"BMPR1A\", \"CMPLAS\", \"VE2QSXTQ\", \"KPSAT\"   Lab Results   Component Value Date    CHOL 156 01/10/2024    LDLCALC 89 01/10/2024    CHHDL 3.4 01/10/2024                "

## 2024-03-11 NOTE — TELEPHONE ENCOUNTER
Rx Refill Request Telephone Encounter    Name:  Nargis Strong  :  [id number]  Medication Name:  levothyroxine (Synthroid) 150 mcg tablet [[id number]]     Specific Pharmacy location:  [id number] - [facility] [address]   [address] [facility] [address]  Phone: [phone]  Fax: [phone]     Date of last appointment:  224    Date of next appointment:  NO PENDING OV    Best number to reach patient:  [phone]      Pt states she only has 1 pill left

## 2024-03-12 RX ORDER — LEVOTHYROXINE SODIUM 150 UG/1
150 TABLET ORAL
Qty: 30 TABLET | Refills: 0 | Status: SHIPPED | OUTPATIENT
Start: 2024-03-12 | End: 2024-04-19

## 2024-03-27 ENCOUNTER — APPOINTMENT (OUTPATIENT)
Dept: PRIMARY CARE | Facility: CLINIC | Age: 29
End: 2024-03-27
Payer: COMMERCIAL

## 2024-03-27 ENCOUNTER — OFFICE VISIT (OUTPATIENT)
Dept: PRIMARY CARE | Facility: CLINIC | Age: 29
End: 2024-03-27
Payer: COMMERCIAL

## 2024-03-27 VITALS
BODY MASS INDEX: 32.47 KG/M2 | WEIGHT: 172 LBS | HEART RATE: 93 BPM | DIASTOLIC BLOOD PRESSURE: 74 MMHG | HEIGHT: 61 IN | SYSTOLIC BLOOD PRESSURE: 111 MMHG | TEMPERATURE: 97.3 F

## 2024-03-27 DIAGNOSIS — M79.89 SWELLING OF LOWER EXTREMITY: ICD-10-CM

## 2024-03-27 DIAGNOSIS — R94.31 ABNORMAL ECG: ICD-10-CM

## 2024-03-27 DIAGNOSIS — T14.8XXA BRUISING: ICD-10-CM

## 2024-03-27 DIAGNOSIS — M79.604 LEG PAIN, BILATERAL: ICD-10-CM

## 2024-03-27 DIAGNOSIS — M79.605 LEG PAIN, BILATERAL: ICD-10-CM

## 2024-03-27 DIAGNOSIS — R94.31 PROLONGED Q-T INTERVAL ON ECG: ICD-10-CM

## 2024-03-27 DIAGNOSIS — R55 FAINTING SPELL: Primary | ICD-10-CM

## 2024-03-27 DIAGNOSIS — R22.1 NECK SWELLING: ICD-10-CM

## 2024-03-27 DIAGNOSIS — R00.2 HEART PALPITATIONS: ICD-10-CM

## 2024-03-27 PROCEDURE — 3008F BODY MASS INDEX DOCD: CPT | Performed by: INTERNAL MEDICINE

## 2024-03-27 PROCEDURE — 93000 ELECTROCARDIOGRAM COMPLETE: CPT | Performed by: INTERNAL MEDICINE

## 2024-03-27 PROCEDURE — 99215 OFFICE O/P EST HI 40 MIN: CPT | Performed by: INTERNAL MEDICINE

## 2024-03-27 PROCEDURE — 1036F TOBACCO NON-USER: CPT | Performed by: INTERNAL MEDICINE

## 2024-03-27 ASSESSMENT — PATIENT HEALTH QUESTIONNAIRE - PHQ9
2. FEELING DOWN, DEPRESSED OR HOPELESS: NOT AT ALL
SUM OF ALL RESPONSES TO PHQ9 QUESTIONS 1 AND 2: 0
1. LITTLE INTEREST OR PLEASURE IN DOING THINGS: NOT AT ALL

## 2024-03-27 NOTE — PROGRESS NOTES
"Subjective   Patient ID: Nargis Strong is a 28 y.o. female who presents for No chief complaint on file..    HPI Pt is a pleasant 28 y.o. F who was seen and evaluated  during the acute FU OV. Pt states that she has the URI about 2-3 weeks ago. Pt was treated with the course of Augmentin. However, since she was sick she started to have spontaneous bruises on her torso and extremities with no trauma, pt also had 2 episodes of nose bleeds (when she had URI), but pt denies any bleeding when she brush her teeth/no blood in stool/urine/no vaginal spotting. Pt also had several episodes of fainting and palpitation with no clear triggering factors. Pt did not pass out, no weakness/numbness/tingling sensation, no change of hearing or vision. 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 Pt also states that she has  the random lower legs swelling in the different time of the day and pain in the lower extremities. Pt also states that she noticed the right sided painless neck swelling for the similar period of time. Pt states that she gained weight. During the clinical encounter pt denies fever, chills, no SOB, no chest pain/tightness, no abdominal pain, no N/V/D reported, no change of urination reported. Pt denies any other health concerns during this visit.  Sohx: reviewed  PMHx and Fhx: reviewed    Review of Systems  12 Systems have been reviewed as follows:   Constitutional: no fever, no chills  Eyes: no eyesight problems, no eye redness, no eye pain, no blurred vision  ENT: no ear pain or sore throat, no nasal discharge or congestion, no sinus pressure, no hearing loss, but as mentioned at HPI  Cardiovascular: no chest pain or tightness, no SOB, as mentioned at HPI and legs swelling and pain  Respiratory: no cough, no dyspnea with exertion or with rest, no wheezing  Gastrointestinal: no abdominal pain, no constipation or diarrhea, no heartburn, no nausea or " "vomiting  Genitourinary: no urine frequency, no dysuria, no urinary urgency, no urinary incontinence or retention  Musculoskeletal: no back pain, no arthralgia, no joint swelling or stiffness, no muscle weakness  Integumentary: no skin rash  Neurological: no headaches, no limb weakness, but as mentioned at HPI  Psychiatric: no mood changes, no anxiety or depression, no SI/HI  Endocrine: no temperature intolerance, no change of appetite, weight gain reported  Hematologic/Lymphatic: easy bruising reported  Objective   /78 (BP Location: Right arm, Patient Position: Sitting)   Pulse 108   Temp 36.3 °C (97.3 °F)   Ht 1.549 m (5' 1\")   Wt 78 kg (172 lb)   BMI 32.50 kg/m²     Physical Exam  Constitutional: well hydrated, well nourished, no acute distress. Vital signs reviewed. The orthostatic vitals were done and negative  Head and face: normocephalic, atraumatic  Eyes: no erythema, edema or visible abnormalities of conjunctiva or lids. Pupils are equal, round and reactive to light. Extraocular movement normal  ENT: external ears without deformities, external ear canals without redness, swelling or tenderness. TMs normal color, normal landmarks, no fluid, non-retracted  Neck: full ROM, no adenopathy, neck was supple, neck exam was significant for the area of the painless swelling, rubbery to touch, about 2 cm in D  Pulmonary: normal respiratory rate and effort. Lungs are clear to auscultation, no rales,no rhonchi or wheezing  Cardiovascular: RRR, normal S1 and S2, no murmur, no gallop, posterior tib. pulse normal 2+ bilaterally, no lower extremity edema  Abdomen: soft, non tender on palpation, not distended, normal BS in all 4 quadrants, no CVA tenderness  Musculoskeletal: no joint swelling, normal movements of all 4 extremities. Gait and station: normal, Digits and nails: normal without clubbing  Skin: normal color, no rash  Neurologic: Cranial nerves: CN II: visual acuity intact. Source: acuity reported by " patient. Pupils reactive to light with normal accommodation. Right and left pupil normal CN III, IV and VI: the EO movements were intact. CN VIII: no hearing loss. Deep tendon reflexes: were 2+ and symmetric:  R and L patella.  Sensory exam: normal light to touch  Psychiatric: Mood and affect: normal, Alert and Oriented x 3  Lymphatic: no cervical lymphadenopathy  Assessment/Plan   Episodes of fainting, heart racing:  Hx and PE as mentioned  ECG was done during the OV and showed prolonged QT with no acute ST changes  Will order CBC, CMP, TSH/T4/T3 level, Mg level  Will order ECHO and evaluation with the cardiology team  Legs swelling and pain: Wells criteria is low, but I will order US dupp b/l as well as U alb and UA  Bruising: will check CBC, INR/PT/PTT. RF, LAURA, ESR  Neck swelling, h/o hypothyroidism: will check thyroid gland function as mentioned, US if the neck tissue and thyroid gland  I discussed every sxs with the pt in detail. Pt verbalized understanding of the health  condition, possible health risks, adverse events or reactions and even death. Pt agreed with the treatment plan and will seek for the immediate medical attention if she will have any new concerning sxs  Pt requested the copy of ECG for the personal record  Please, keep the close FU OV

## 2024-03-28 ENCOUNTER — LAB (OUTPATIENT)
Dept: LAB | Facility: LAB | Age: 29
End: 2024-03-28
Payer: COMMERCIAL

## 2024-03-28 DIAGNOSIS — E06.3 HYPOTHYROIDISM DUE TO HASHIMOTO'S THYROIDITIS: ICD-10-CM

## 2024-03-28 DIAGNOSIS — M79.89 SWELLING OF LOWER EXTREMITY: ICD-10-CM

## 2024-03-28 DIAGNOSIS — R55 FAINTING SPELL: ICD-10-CM

## 2024-03-28 DIAGNOSIS — T14.8XXA BRUISING: ICD-10-CM

## 2024-03-28 DIAGNOSIS — E03.8 HYPOTHYROIDISM DUE TO HASHIMOTO'S THYROIDITIS: ICD-10-CM

## 2024-03-28 DIAGNOSIS — R00.2 HEART PALPITATIONS: ICD-10-CM

## 2024-03-28 DIAGNOSIS — R79.89 ABNORMAL TSH: ICD-10-CM

## 2024-03-28 DIAGNOSIS — R22.1 NECK SWELLING: ICD-10-CM

## 2024-03-28 LAB
ALBUMIN SERPL BCP-MCNC: 4.2 G/DL (ref 3.4–5)
ALP SERPL-CCNC: 62 U/L (ref 33–110)
ALT SERPL W P-5'-P-CCNC: 14 U/L (ref 7–45)
ANION GAP SERPL CALC-SCNC: 10 MMOL/L (ref 10–20)
APPEARANCE UR: CLEAR
APTT PPP: 36 SECONDS (ref 27–38)
AST SERPL W P-5'-P-CCNC: 17 U/L (ref 9–39)
BASOPHILS # BLD AUTO: 0.04 X10*3/UL (ref 0–0.1)
BASOPHILS NFR BLD AUTO: 0.9 %
BILIRUB SERPL-MCNC: 0.4 MG/DL (ref 0–1.2)
BILIRUB UR STRIP.AUTO-MCNC: NEGATIVE MG/DL
BUN SERPL-MCNC: 12 MG/DL (ref 6–23)
CALCIUM SERPL-MCNC: 9 MG/DL (ref 8.6–10.3)
CHLORIDE SERPL-SCNC: 101 MMOL/L (ref 98–107)
CO2 SERPL-SCNC: 29 MMOL/L (ref 21–32)
COLOR UR: YELLOW
CREAT SERPL-MCNC: 0.64 MG/DL (ref 0.5–1.05)
CREAT UR-MCNC: 175.3 MG/DL (ref 20–320)
EGFRCR SERPLBLD CKD-EPI 2021: >90 ML/MIN/1.73M*2
EOSINOPHIL # BLD AUTO: 0.1 X10*3/UL (ref 0–0.7)
EOSINOPHIL NFR BLD AUTO: 2.3 %
ERYTHROCYTE [DISTWIDTH] IN BLOOD BY AUTOMATED COUNT: 12.8 % (ref 11.5–14.5)
ERYTHROCYTE [SEDIMENTATION RATE] IN BLOOD BY WESTERGREN METHOD: 27 MM/H (ref 0–20)
GLUCOSE SERPL-MCNC: 92 MG/DL (ref 74–99)
GLUCOSE UR STRIP.AUTO-MCNC: NEGATIVE MG/DL
HCT VFR BLD AUTO: 43.7 % (ref 36–46)
HGB BLD-MCNC: 14.1 G/DL (ref 12–16)
IMM GRANULOCYTES # BLD AUTO: 0.01 X10*3/UL (ref 0–0.7)
IMM GRANULOCYTES NFR BLD AUTO: 0.2 % (ref 0–0.9)
INR PPP: 1.1 (ref 0.9–1.1)
KETONES UR STRIP.AUTO-MCNC: NEGATIVE MG/DL
LEUKOCYTE ESTERASE UR QL STRIP.AUTO: NEGATIVE
LYMPHOCYTES # BLD AUTO: 1.67 X10*3/UL (ref 1.2–4.8)
LYMPHOCYTES NFR BLD AUTO: 38.8 %
MAGNESIUM SERPL-MCNC: 1.78 MG/DL (ref 1.6–2.4)
MCH RBC QN AUTO: 29 PG (ref 26–34)
MCHC RBC AUTO-ENTMCNC: 32.3 G/DL (ref 32–36)
MCV RBC AUTO: 90 FL (ref 80–100)
MICROALBUMIN UR-MCNC: <7 MG/L
MICROALBUMIN/CREAT UR: NORMAL MG/G{CREAT}
MONOCYTES # BLD AUTO: 0.52 X10*3/UL (ref 0.1–1)
MONOCYTES NFR BLD AUTO: 12.1 %
NEUTROPHILS # BLD AUTO: 1.96 X10*3/UL (ref 1.2–7.7)
NEUTROPHILS NFR BLD AUTO: 45.7 %
NITRITE UR QL STRIP.AUTO: NEGATIVE
NRBC BLD-RTO: 0 /100 WBCS (ref 0–0)
PH UR STRIP.AUTO: 6 [PH]
PLATELET # BLD AUTO: 323 X10*3/UL (ref 150–450)
POTASSIUM SERPL-SCNC: 4.2 MMOL/L (ref 3.5–5.3)
PROT SERPL-MCNC: 7.8 G/DL (ref 6.4–8.2)
PROT UR STRIP.AUTO-MCNC: NEGATIVE MG/DL
PROTHROMBIN TIME: 12.2 SECONDS (ref 9.8–12.8)
RBC # BLD AUTO: 4.86 X10*6/UL (ref 4–5.2)
RBC # UR STRIP.AUTO: NEGATIVE /UL
RHEUMATOID FACT SER NEPH-ACNC: <10 IU/ML (ref 0–15)
SODIUM SERPL-SCNC: 136 MMOL/L (ref 136–145)
SP GR UR STRIP.AUTO: 1.02
T3FREE SERPL-MCNC: 3.4 PG/ML (ref 2.3–4.2)
TSH SERPL-ACNC: 1.5 MIU/L (ref 0.44–3.98)
UROBILINOGEN UR STRIP.AUTO-MCNC: <2 MG/DL
WBC # BLD AUTO: 4.3 X10*3/UL (ref 4.4–11.3)

## 2024-03-28 PROCEDURE — 86431 RHEUMATOID FACTOR QUANT: CPT

## 2024-03-28 PROCEDURE — 81003 URINALYSIS AUTO W/O SCOPE: CPT

## 2024-03-28 PROCEDURE — 83735 ASSAY OF MAGNESIUM: CPT

## 2024-03-28 PROCEDURE — 85610 PROTHROMBIN TIME: CPT

## 2024-03-28 PROCEDURE — 36415 COLL VENOUS BLD VENIPUNCTURE: CPT

## 2024-03-28 PROCEDURE — 85730 THROMBOPLASTIN TIME PARTIAL: CPT

## 2024-03-28 PROCEDURE — 82043 UR ALBUMIN QUANTITATIVE: CPT

## 2024-03-28 PROCEDURE — 82570 ASSAY OF URINE CREATININE: CPT

## 2024-03-28 PROCEDURE — 84481 FREE ASSAY (FT-3): CPT

## 2024-03-28 PROCEDURE — 84443 ASSAY THYROID STIM HORMONE: CPT

## 2024-03-28 PROCEDURE — 85652 RBC SED RATE AUTOMATED: CPT

## 2024-03-28 PROCEDURE — 85025 COMPLETE CBC W/AUTO DIFF WBC: CPT

## 2024-03-28 PROCEDURE — 80053 COMPREHEN METABOLIC PANEL: CPT

## 2024-03-28 PROCEDURE — 86038 ANTINUCLEAR ANTIBODIES: CPT

## 2024-03-29 LAB — ANA SER QL HEP2 SUBST: NEGATIVE

## 2024-04-03 ENCOUNTER — TELEMEDICINE (OUTPATIENT)
Dept: PRIMARY CARE | Facility: CLINIC | Age: 29
End: 2024-04-03
Payer: COMMERCIAL

## 2024-04-03 DIAGNOSIS — Z71.2 ENCOUNTER TO DISCUSS TEST RESULTS: Primary | ICD-10-CM

## 2024-04-03 DIAGNOSIS — F41.8 ANXIETY ABOUT HEALTH: ICD-10-CM

## 2024-04-03 DIAGNOSIS — R70.0 ELEVATED ERYTHROCYTE SEDIMENTATION RATE: ICD-10-CM

## 2024-04-03 DIAGNOSIS — D72.818 OTHER DECREASED WHITE BLOOD CELL (WBC) COUNT: ICD-10-CM

## 2024-04-03 DIAGNOSIS — Z76.0 ENCOUNTER FOR MEDICATION REFILL: ICD-10-CM

## 2024-04-03 DIAGNOSIS — R03.1 LOW BLOOD PRESSURE, NOT HYPOTENSION: ICD-10-CM

## 2024-04-03 DIAGNOSIS — E55.9 VITAMIN D INSUFFICIENCY: ICD-10-CM

## 2024-04-03 PROCEDURE — 99442 PR PHYS/QHP TELEPHONE EVALUATION 11-20 MIN: CPT | Performed by: INTERNAL MEDICINE

## 2024-04-03 PROCEDURE — 3008F BODY MASS INDEX DOCD: CPT | Performed by: INTERNAL MEDICINE

## 2024-04-03 RX ORDER — ERGOCALCIFEROL 1.25 MG/1
50000 CAPSULE ORAL
Qty: 12 CAPSULE | Refills: 0 | Status: SHIPPED | OUTPATIENT
Start: 2024-04-03

## 2024-04-03 RX ORDER — HYDROXYZINE PAMOATE 50 MG/1
50 CAPSULE ORAL 3 TIMES DAILY PRN
Qty: 30 CAPSULE | Refills: 0 | Status: SHIPPED | OUTPATIENT
Start: 2024-04-03 | End: 2024-06-11

## 2024-04-03 NOTE — PROGRESS NOTES
"Subjective   Patient ID: Nargis Strong is a 28 y.o. female who presents for Follow-up (2 week follow-up).    [facility] Pt is a pleasant 28 y.o. F who was evaluated during the phone call visit. Pt had the extensive evaluation for the sxs of dizziness and fast heart beats last week. Pt had the episode of dizziness at work on 4/1/2024. Pt checked her BP and it was 98/60 mm hg. Pt went home and sleep foe the extensive period of time. Pt also had several episodes of palpitation with no chest pain during the last week. Pt has the upcoming US of the neck/thyroid gland, ECHO and will see the cardiology team as well. Pt also reports the sxs of anxiety, she tool hydroxyzine 50 mg PO once and the sxs of anxiety subsided. Pt would like to have a refill on hydroxyzine \"just in case if she will have another anxiety attack\". Pt also did a COVID test which was negative for infection. During the clinical encounter pt denies fever, chills, no SOB, no chest pain/tightness, no abdominal pain, no N/V/D reported, no change of urination reported, pt did not have LOC/change of vision. Pt denies any other health concerns during this visit.  Sohx: reviewed  PMHx and Fhx: reviewed    Review of Systems  12 Systems have been reviewed as follows:   Constitutional: no fever, no chills  Eyes: no eyesight problems, no eye redness, no eye pain, no blurred vision  ENT: no ear pain or sore throat, no nasal discharge or congestion, no sinus pressure, no hearing loss  Cardiovascular: no chest pain or tightness, no SOB, vut as mentioned at HPI  Respiratory: no cough, no dyspnea with exertion or with rest, no wheezing  Gastrointestinal: no abdominal pain, no constipation or diarrhea, no heartburn, no nausea or vomiting  Genitourinary: no urine frequency, no dysuria, no urinary urgency, no urinary incontinence or retention  Musculoskeletal: no back pain, no arthralgia, no joint swelling or stiffness, no muscle weakness  Integumentary: no skin lesions or " rash  Neurological: no limb weakness. But as mentioned  Psychiatric: no mood changes, no anxiety or depression, no SI/HI  Hematologic/Lymphatic: no easy bruising or bleeding  Objective   There were no vitals taken for this visit.    Physical Exam  PE was not performed due to the phone setting of this visit, but pt was able to speak in full sentences, alert, oriented, not confused.  Vitals: were not provided    Assessment/Plan   Encounter to discuss the recent test results: I shared and discussed the recent test results with the pt in details. Pt verbalized understating.  WBC slightly elevated, ESR increased, but pt had a recent URI. Will repeat CBC with the next blood work  Low BP readings: The BP readings were borderline low. Pt never passed out. Pt also had the episodes pf heart racing. I offered the Holter study, but pt would like to wait on it until she will see the cardiology team next week.  Anxiety: sxs controlled on hydroxyzine 50 mg PO. Will order hydroxyzine for the next 10 days. Pt aware about possible SE of the medication and health risks/adverse reactions, verbalized understanding.  Vit D insufficiency: will refill vit D 50 ooo international units  weekly  Plan was d/c with the pt in details, verbalized understanding, agreed  Please follow up with PCP as planned or sooner if needed      A telephone visit (audio only) between the patient (at the originating site) and the provider (at the distant site) was utilized to provide this telehealth service.  Verbal consent was requested and obtained from the patient during a telehealth visit.

## 2024-04-05 ENCOUNTER — APPOINTMENT (OUTPATIENT)
Dept: CARDIOLOGY | Facility: CLINIC | Age: 29
End: 2024-04-05
Payer: COMMERCIAL

## 2024-04-08 ENCOUNTER — APPOINTMENT (OUTPATIENT)
Dept: CARDIOLOGY | Facility: CLINIC | Age: 29
End: 2024-04-08
Payer: COMMERCIAL

## 2024-04-10 ENCOUNTER — APPOINTMENT (OUTPATIENT)
Dept: CARDIOLOGY | Facility: CLINIC | Age: 29
End: 2024-04-10
Payer: COMMERCIAL

## 2024-04-10 ENCOUNTER — APPOINTMENT (OUTPATIENT)
Dept: RADIOLOGY | Facility: HOSPITAL | Age: 29
End: 2024-04-10
Payer: COMMERCIAL

## 2024-04-10 ENCOUNTER — APPOINTMENT (OUTPATIENT)
Dept: CARDIOLOGY | Facility: HOSPITAL | Age: 29
End: 2024-04-10
Payer: COMMERCIAL

## 2024-04-17 DIAGNOSIS — R79.89 ABNORMAL TSH: ICD-10-CM

## 2024-04-17 DIAGNOSIS — E06.3 HYPOTHYROIDISM DUE TO HASHIMOTO'S THYROIDITIS: ICD-10-CM

## 2024-04-17 DIAGNOSIS — E03.8 HYPOTHYROIDISM DUE TO HASHIMOTO'S THYROIDITIS: ICD-10-CM

## 2024-04-19 RX ORDER — LEVOTHYROXINE SODIUM 150 UG/1
TABLET ORAL
Qty: 90 TABLET | Refills: 1 | Status: SHIPPED | OUTPATIENT
Start: 2024-04-19

## 2024-05-03 NOTE — TELEPHONE ENCOUNTER
Patient calling today asking what the next step is for her elevated thyroid lab please advise  N.O.V= 2-1-24,  Dr Gee might have addressed this at L.O.V 11-8-23   83

## 2024-05-08 ENCOUNTER — TELEPHONE (OUTPATIENT)
Dept: CARDIOLOGY | Facility: CLINIC | Age: 29
End: 2024-05-08
Payer: COMMERCIAL

## 2024-05-15 ENCOUNTER — APPOINTMENT (OUTPATIENT)
Dept: CARDIOLOGY | Facility: CLINIC | Age: 29
End: 2024-05-15
Payer: COMMERCIAL

## 2024-06-05 ENCOUNTER — APPOINTMENT (OUTPATIENT)
Dept: RADIOLOGY | Facility: HOSPITAL | Age: 29
End: 2024-06-05
Payer: COMMERCIAL

## 2024-06-06 ENCOUNTER — APPOINTMENT (OUTPATIENT)
Dept: RADIOLOGY | Facility: HOSPITAL | Age: 29
End: 2024-06-06
Payer: COMMERCIAL

## 2024-06-07 ENCOUNTER — HOSPITAL ENCOUNTER (OUTPATIENT)
Dept: RADIOLOGY | Facility: HOSPITAL | Age: 29
Discharge: HOME | End: 2024-06-07
Payer: COMMERCIAL

## 2024-06-07 DIAGNOSIS — R22.1 NECK SWELLING: ICD-10-CM

## 2024-06-07 PROCEDURE — 76536 US EXAM OF HEAD AND NECK: CPT

## 2024-06-10 DIAGNOSIS — R79.89 ABNORMAL TSH: Primary | ICD-10-CM

## 2024-06-10 DIAGNOSIS — E06.3 HYPOTHYROIDISM DUE TO HASHIMOTO'S THYROIDITIS: ICD-10-CM

## 2024-06-10 DIAGNOSIS — E03.8 HYPOTHYROIDISM DUE TO HASHIMOTO'S THYROIDITIS: ICD-10-CM

## 2024-06-11 ENCOUNTER — LAB (OUTPATIENT)
Dept: LAB | Facility: LAB | Age: 29
End: 2024-06-11
Payer: COMMERCIAL

## 2024-06-11 ENCOUNTER — OFFICE VISIT (OUTPATIENT)
Dept: PRIMARY CARE | Facility: CLINIC | Age: 29
End: 2024-06-11
Payer: COMMERCIAL

## 2024-06-11 ENCOUNTER — APPOINTMENT (OUTPATIENT)
Dept: CARDIOLOGY | Facility: CLINIC | Age: 29
End: 2024-06-11
Payer: COMMERCIAL

## 2024-06-11 VITALS
TEMPERATURE: 97.3 F | BODY MASS INDEX: 34.74 KG/M2 | DIASTOLIC BLOOD PRESSURE: 79 MMHG | SYSTOLIC BLOOD PRESSURE: 118 MMHG | HEART RATE: 87 BPM | WEIGHT: 184 LBS | HEIGHT: 61 IN

## 2024-06-11 DIAGNOSIS — R53.83 OTHER FATIGUE: ICD-10-CM

## 2024-06-11 DIAGNOSIS — E03.8 HYPOTHYROIDISM DUE TO HASHIMOTO'S THYROIDITIS: ICD-10-CM

## 2024-06-11 DIAGNOSIS — R79.89 ABNORMAL TSH: ICD-10-CM

## 2024-06-11 DIAGNOSIS — F41.9 ANXIETY: ICD-10-CM

## 2024-06-11 DIAGNOSIS — E06.3 HYPOTHYROIDISM DUE TO HASHIMOTO'S THYROIDITIS: ICD-10-CM

## 2024-06-11 LAB
T3FREE SERPL-MCNC: 2.9 PG/ML (ref 2.3–4.2)
THYROPEROXIDASE AB SERPL-ACNC: >1000 IU/ML
TSH SERPL-ACNC: 3.49 MIU/L (ref 0.44–3.98)
VIT B12 SERPL-MCNC: 443 PG/ML (ref 211–911)

## 2024-06-11 PROCEDURE — 36415 COLL VENOUS BLD VENIPUNCTURE: CPT

## 2024-06-11 PROCEDURE — 84443 ASSAY THYROID STIM HORMONE: CPT

## 2024-06-11 PROCEDURE — 84481 FREE ASSAY (FT-3): CPT

## 2024-06-11 PROCEDURE — 86376 MICROSOMAL ANTIBODY EACH: CPT

## 2024-06-11 PROCEDURE — 82607 VITAMIN B-12: CPT

## 2024-06-12 ENCOUNTER — OFFICE VISIT (OUTPATIENT)
Dept: PRIMARY CARE | Facility: CLINIC | Age: 29
End: 2024-06-12
Payer: COMMERCIAL

## 2024-06-12 VITALS
WEIGHT: 184 LBS | TEMPERATURE: 97.4 F | HEIGHT: 61 IN | HEART RATE: 109 BPM | SYSTOLIC BLOOD PRESSURE: 122 MMHG | DIASTOLIC BLOOD PRESSURE: 76 MMHG | BODY MASS INDEX: 34.74 KG/M2

## 2024-06-12 DIAGNOSIS — J02.9 SORE THROAT: ICD-10-CM

## 2024-06-12 DIAGNOSIS — E06.3 HYPOTHYROIDISM DUE TO HASHIMOTO'S THYROIDITIS: ICD-10-CM

## 2024-06-12 DIAGNOSIS — E03.8 HYPOTHYROIDISM DUE TO HASHIMOTO'S THYROIDITIS: ICD-10-CM

## 2024-06-12 DIAGNOSIS — J01.90 ACUTE SINUSITIS, RECURRENCE NOT SPECIFIED, UNSPECIFIED LOCATION: Primary | ICD-10-CM

## 2024-06-12 DIAGNOSIS — Z71.2 ENCOUNTER TO DISCUSS TEST RESULTS: ICD-10-CM

## 2024-06-12 DIAGNOSIS — R22.1 NECK SWELLING: ICD-10-CM

## 2024-06-12 LAB — POC RAPID STREP: NEGATIVE

## 2024-06-12 PROCEDURE — 87880 STREP A ASSAY W/OPTIC: CPT | Performed by: INTERNAL MEDICINE

## 2024-06-12 PROCEDURE — 1036F TOBACCO NON-USER: CPT | Performed by: INTERNAL MEDICINE

## 2024-06-12 PROCEDURE — 3008F BODY MASS INDEX DOCD: CPT | Performed by: INTERNAL MEDICINE

## 2024-06-12 PROCEDURE — 99215 OFFICE O/P EST HI 40 MIN: CPT | Performed by: INTERNAL MEDICINE

## 2024-06-12 RX ORDER — AMOXICILLIN AND CLAVULANATE POTASSIUM 875; 125 MG/1; MG/1
875 TABLET, FILM COATED ORAL 2 TIMES DAILY
Qty: 20 TABLET | Refills: 0 | Status: SHIPPED | OUTPATIENT
Start: 2024-06-12 | End: 2024-06-22

## 2024-06-12 ASSESSMENT — PATIENT HEALTH QUESTIONNAIRE - PHQ9
1. LITTLE INTEREST OR PLEASURE IN DOING THINGS: NOT AT ALL
2. FEELING DOWN, DEPRESSED OR HOPELESS: NOT AT ALL
SUM OF ALL RESPONSES TO PHQ9 QUESTIONS 1 AND 2: 0

## 2024-06-12 NOTE — PROGRESS NOTES
"Subjective   Patient ID: Nargis Strong is a 28 y.o. female who presents for Results and Sore Throat (Fever, wants strep test).    HPI Pt is a pleasant 28 y.o. F who was seen and evaluated during the OV with 2 day hx of sore throat, congested, painful sinuses and episode of possible fever last night. Pt denies any COVID exposure. Pt had the BW done as well as thyroid gland US done and would like to discuss the test results in details. During the clinical encounter pt denies  SOB, no chest pain/tightness, no abdominal pain, no N/V/D reported, no change of urination reported. Pt denies any other health concerns during this visit.  Sohx: reviewed  List of the medications and allergies: reviewed  PMHx and Fhx: reviewed    Review of Systems  The systems have been reviewed as follows:   Constitutional: no fever, no chills  Eyes: no eyesight problems, no eye redness, no eye pain, no blurred vision  ENT: no ear pain, no hearing loss, but as mentioned at [facility]  Cardiovascular: no chest pain or tightness, no SOB, the heart rate was not fast or slow  Respiratory: no cough, no dyspnea with exertion or with rest, no wheezing  Gastrointestinal: no abdominal pain, no constipation or diarrhea, no heartburn, no nausea or vomiting  Genitourinary: no urine frequency, no dysuria, no urinary urgency, no urinary incontinence or retention  Musculoskeletal: no back pain, no arthralgia, no joint swelling or stiffness, no muscle weakness  Integumentary: no skin lesions or rash  Neurological: no headaches, no dizziness or fainting, no limb weakness  Psychiatric: no mood changes, no anxiety or depression, no SI/HI  Endocrine: no weight change, no temperature intolerance, no   change of appetite  Hematologic/Lymphatic: no easy bruising or bleeding  Objective   /76 (BP Location: Left arm, Patient Position: Sitting, BP Cuff Size: Adult)   Pulse 109   Temp 36.3 °C (97.4 °F)   Ht 1.549 m (5' 1\")   Wt 83.5 kg (184 lb)   BMI 34.77 kg/m² "     Physical Exam  Constitutional: well hydrated, well nourished, no acute distress. Vital signs reviewed  Head and face: normocephalic, atraumatic  Eyes: no erythema, edema or visible abnormalities of conjunctiva or lids. Pupils are equal, round and reactive to light. Extraocular movement normal  ENT: external ears without deformities  Neck: full ROM  Pulmonary: normal respiratory rate and effort. Lungs are clear to auscultation, no rales,no rhonchi or wheezing  Cardiovascular: RRR, normal S1 and S2, no murmur, no gallop, posterior tib. pulse normal 2+ bilaterally, no lower extremity edema  Abdomen: soft, non tender on palpation, not distended, normal BS in all 4 quadrants  Musculoskeletal: no joint swelling, normal movements of all 4 extremities. Gait and station: normal, Digits and nails: normal without clubbing  Skin: normal color, no rash  Neurologic: Cranial nerves: CN II: visual acuity intact. Source: acuity reported by patient. Pupils reactive to light with normal accommodation. Right and left pupil normal CN III, IV and VI: the EO movements were intact. CN VIII: no hearing loss. Sensory exam: normal to light touch  Psychiatric: Mood and affect: normal, Alert and Oriented x 3  Lymphatic: no cervical lymphadenopathy  Assessment/Plan   Acute sinusitis, sore throat:  Hx and PE as mentioned POCT strep throat test was negative  Will start on Amox/Clav 875/125 mg PO BID x 10 days Pt denies any chance of pregnancy  Pt was instructed to contact PCP if pt will have no improvement of the condition/worsening of the sxs/new sxs on the current treatment    Hypothyroidism, hx of the neck swelling:  The recent test results were reviewed, discussed and shared with the pt. Pt verbalized understanding  Based on the recent TSH/T4/T3 level pt can continue the current dose of levothyroxine  Pt would like to be seen by the ENT and endocrinology team. The referrals were issued.  I discussed every sxs with the pt in detail. Pt  verbalized understanding of the health  condition and agreed with the clinical approach as discussed as mentioned above  Please FU with PCP as planned or sooner if neede

## 2024-06-13 ENCOUNTER — TELEPHONE (OUTPATIENT)
Dept: PRIMARY CARE | Facility: CLINIC | Age: 29
End: 2024-06-13
Payer: COMMERCIAL

## 2024-06-13 DIAGNOSIS — J02.9 SORE THROAT: Primary | ICD-10-CM

## 2024-06-13 RX ORDER — METHYLPREDNISOLONE 4 MG/1
TABLET ORAL
Qty: 21 TABLET | Refills: 0 | Status: SHIPPED | OUTPATIENT
Start: 2024-06-13 | End: 2024-06-20

## 2024-06-13 NOTE — TELEPHONE ENCOUNTER
Patient requesting steroid pack for URI instead of   amoxicillin-pot clavulanate (Augmentin) 875-125 mg tablet. Patient is also requesting a call back to discuss medication if possible  [phone]

## 2024-06-19 ENCOUNTER — APPOINTMENT (OUTPATIENT)
Dept: CARDIOLOGY | Facility: CLINIC | Age: 29
End: 2024-06-19
Payer: COMMERCIAL

## 2024-06-19 ENCOUNTER — TELEPHONE (OUTPATIENT)
Dept: CARDIOLOGY | Facility: CLINIC | Age: 29
End: 2024-06-19

## 2024-06-19 VITALS
HEIGHT: 61 IN | DIASTOLIC BLOOD PRESSURE: 68 MMHG | TEMPERATURE: 97.9 F | SYSTOLIC BLOOD PRESSURE: 106 MMHG | HEART RATE: 90 BPM | BODY MASS INDEX: 34.36 KG/M2 | WEIGHT: 182 LBS

## 2024-06-19 DIAGNOSIS — E66.9 OBESITY (BMI 30.0-34.9): Primary | ICD-10-CM

## 2024-06-19 DIAGNOSIS — R07.89 ATYPICAL CHEST PAIN: ICD-10-CM

## 2024-06-19 DIAGNOSIS — R94.31 ABNORMAL ECG: ICD-10-CM

## 2024-06-19 DIAGNOSIS — R00.2 PALPITATIONS: ICD-10-CM

## 2024-06-19 DIAGNOSIS — R00.2 HEART PALPITATIONS: ICD-10-CM

## 2024-06-19 PROCEDURE — 3008F BODY MASS INDEX DOCD: CPT | Performed by: INTERNAL MEDICINE

## 2024-06-19 PROCEDURE — 99204 OFFICE O/P NEW MOD 45 MIN: CPT | Performed by: INTERNAL MEDICINE

## 2024-06-19 PROCEDURE — 1036F TOBACCO NON-USER: CPT | Performed by: INTERNAL MEDICINE

## 2024-06-19 NOTE — PROGRESS NOTES
1.  Hypothyroidism  2.  Palpitations  3.  Atypical chest pains  4.  Overweight, BMI 34.4    Patient is a pleasant 28-year-old female with the above-noted pertinent past medical history who presents today for evaluation of abnormal EKG and palpitations.  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    No known drug allergies    Medication list is reviewed as noted in the chart    Review of system  She recently had an eye checkup no cataracts glaucoma is reported she consumes half a cup of coffee daily, she has scheduled exercises going to the gym on a daily basis where she is able to swim 12 labs she also exercising on the treadmill for about 30 minutes covering 1.5 miles, there is no family history sudden cardiac death, all other systems were reviewed and negative for complaints    BMI is elevated at 34.4 blood pressure 106/68 mmHg and heart rate of 90 bpm patient is a well-developed well-nourished pleasant female in no acute distress speaking full sentences, no carotid bruits upstroke and volumes are normal,  heart rate and rhythm are regular S1 and S2 are normal intensity no gallop murmurs rubs or midsystolic clicks appreciated lungs clear to auscultation abdomen is moderately distended positive bowel sounds soft.    Twelve-lead EKG dating back to March 2024 at which time was interpreted as abnormal with prolonged QT, I remeasured QTc 376 ms, which is considered normal for females  TSH is normal at 3.49    Impression and plan 28-year-old female with complaints of palpitations with sudden onset and gradual relief lasting 1-3 minutes these events are associated with sharp and jabbing chest discomfort, I have recommended to have an echocardiogram for assessment of LV function and occult valvular lesions, a 2 weeks Zio patch is recommended for diagnosis of palpitation, for her atypical chest discomfort shake patient will be tested and by stress testing and myocardial perfusion imaging, for rule out ischemia, patient is to return to my clinic after above tests are completed.  Heart healthy diet and weight loss recommended.

## 2024-06-26 ENCOUNTER — APPOINTMENT (OUTPATIENT)
Dept: CARDIOLOGY | Facility: CLINIC | Age: 29
End: 2024-06-26
Payer: COMMERCIAL

## 2024-07-05 ENCOUNTER — APPOINTMENT (OUTPATIENT)
Dept: CARDIOLOGY | Facility: HOSPITAL | Age: 29
End: 2024-07-05
Payer: COMMERCIAL

## 2024-07-05 ENCOUNTER — APPOINTMENT (OUTPATIENT)
Dept: RADIOLOGY | Facility: HOSPITAL | Age: 29
End: 2024-07-05
Payer: COMMERCIAL

## 2024-07-08 ENCOUNTER — APPOINTMENT (OUTPATIENT)
Dept: RADIOLOGY | Facility: HOSPITAL | Age: 29
End: 2024-07-08
Payer: COMMERCIAL

## 2024-07-08 ENCOUNTER — HOSPITAL ENCOUNTER (EMERGENCY)
Facility: HOSPITAL | Age: 29
Discharge: HOME | End: 2024-07-09
Attending: EMERGENCY MEDICINE
Payer: COMMERCIAL

## 2024-07-08 DIAGNOSIS — N93.9 ABNORMAL UTERINE BLEEDING: ICD-10-CM

## 2024-07-08 DIAGNOSIS — K92.0 HEMATEMESIS WITH NAUSEA: Primary | ICD-10-CM

## 2024-07-08 LAB
ABO GROUP (TYPE) IN BLOOD: NORMAL
ALBUMIN SERPL BCP-MCNC: 3.8 G/DL (ref 3.4–5)
ALP SERPL-CCNC: 60 U/L (ref 33–110)
ALT SERPL W P-5'-P-CCNC: 12 U/L (ref 7–45)
ANION GAP SERPL CALC-SCNC: 11 MMOL/L (ref 10–20)
ANTIBODY SCREEN: NORMAL
APPEARANCE UR: CLEAR
AST SERPL W P-5'-P-CCNC: 14 U/L (ref 9–39)
B-HCG SERPL-ACNC: <2 MIU/ML
BACTERIA #/AREA URNS AUTO: ABNORMAL /HPF
BASOPHILS # BLD AUTO: 0.05 X10*3/UL (ref 0–0.1)
BASOPHILS NFR BLD AUTO: 0.9 %
BILIRUB SERPL-MCNC: 0.3 MG/DL (ref 0–1.2)
BILIRUB UR STRIP.AUTO-MCNC: NEGATIVE MG/DL
BUN SERPL-MCNC: 12 MG/DL (ref 6–23)
CALCIUM SERPL-MCNC: 8.7 MG/DL (ref 8.6–10.3)
CHLORIDE SERPL-SCNC: 104 MMOL/L (ref 98–107)
CO2 SERPL-SCNC: 25 MMOL/L (ref 21–32)
COLOR UR: COLORLESS
CREAT SERPL-MCNC: 0.57 MG/DL (ref 0.5–1.05)
EGFRCR SERPLBLD CKD-EPI 2021: >90 ML/MIN/1.73M*2
EOSINOPHIL # BLD AUTO: 0.12 X10*3/UL (ref 0–0.7)
EOSINOPHIL NFR BLD AUTO: 2.1 %
ERYTHROCYTE [DISTWIDTH] IN BLOOD BY AUTOMATED COUNT: 13.2 % (ref 11.5–14.5)
GLUCOSE SERPL-MCNC: 80 MG/DL (ref 74–99)
GLUCOSE UR STRIP.AUTO-MCNC: NORMAL MG/DL
HCG UR QL IA.RAPID: NEGATIVE
HCT VFR BLD AUTO: 39.7 % (ref 36–46)
HGB BLD-MCNC: 12.9 G/DL (ref 12–16)
IMM GRANULOCYTES # BLD AUTO: 0.01 X10*3/UL (ref 0–0.7)
IMM GRANULOCYTES NFR BLD AUTO: 0.2 % (ref 0–0.9)
KETONES UR STRIP.AUTO-MCNC: ABNORMAL MG/DL
LEUKOCYTE ESTERASE UR QL STRIP.AUTO: ABNORMAL
LIPASE SERPL-CCNC: 23 U/L (ref 9–82)
LYMPHOCYTES # BLD AUTO: 1.48 X10*3/UL (ref 1.2–4.8)
LYMPHOCYTES NFR BLD AUTO: 26.5 %
MCH RBC QN AUTO: 29.3 PG (ref 26–34)
MCHC RBC AUTO-ENTMCNC: 32.5 G/DL (ref 32–36)
MCV RBC AUTO: 90 FL (ref 80–100)
MONOCYTES # BLD AUTO: 0.77 X10*3/UL (ref 0.1–1)
MONOCYTES NFR BLD AUTO: 13.8 %
NEUTROPHILS # BLD AUTO: 3.16 X10*3/UL (ref 1.2–7.7)
NEUTROPHILS NFR BLD AUTO: 56.5 %
NITRITE UR QL STRIP.AUTO: NEGATIVE
NRBC BLD-RTO: 0 /100 WBCS (ref 0–0)
PH UR STRIP.AUTO: 6 [PH]
PLATELET # BLD AUTO: 327 X10*3/UL (ref 150–450)
POTASSIUM SERPL-SCNC: 3.6 MMOL/L (ref 3.5–5.3)
PROT SERPL-MCNC: 7.4 G/DL (ref 6.4–8.2)
PROT UR STRIP.AUTO-MCNC: ABNORMAL MG/DL
RBC # BLD AUTO: 4.4 X10*6/UL (ref 4–5.2)
RBC # UR STRIP.AUTO: ABNORMAL /UL
RBC #/AREA URNS AUTO: >20 /HPF
RH FACTOR (ANTIGEN D): NORMAL
SODIUM SERPL-SCNC: 136 MMOL/L (ref 136–145)
SP GR UR STRIP.AUTO: 1.01
SQUAMOUS #/AREA URNS AUTO: ABNORMAL /HPF
UROBILINOGEN UR STRIP.AUTO-MCNC: NORMAL MG/DL
WBC # BLD AUTO: 5.6 X10*3/UL (ref 4.4–11.3)
WBC #/AREA URNS AUTO: ABNORMAL /HPF

## 2024-07-08 PROCEDURE — 74174 CTA ABD&PLVS W/CONTRAST: CPT

## 2024-07-08 PROCEDURE — 85025 COMPLETE CBC W/AUTO DIFF WBC: CPT | Performed by: STUDENT IN AN ORGANIZED HEALTH CARE EDUCATION/TRAINING PROGRAM

## 2024-07-08 PROCEDURE — 83690 ASSAY OF LIPASE: CPT | Performed by: EMERGENCY MEDICINE

## 2024-07-08 PROCEDURE — 74175 CTA ABDOMEN W/CONTRAST: CPT | Performed by: RADIOLOGY

## 2024-07-08 PROCEDURE — 76830 TRANSVAGINAL US NON-OB: CPT | Performed by: RADIOLOGY

## 2024-07-08 PROCEDURE — 81025 URINE PREGNANCY TEST: CPT | Performed by: STUDENT IN AN ORGANIZED HEALTH CARE EDUCATION/TRAINING PROGRAM

## 2024-07-08 PROCEDURE — 74018 RADEX ABDOMEN 1 VIEW: CPT | Performed by: STUDENT IN AN ORGANIZED HEALTH CARE EDUCATION/TRAINING PROGRAM

## 2024-07-08 PROCEDURE — 99285 EMERGENCY DEPT VISIT HI MDM: CPT | Mod: 25

## 2024-07-08 PROCEDURE — 36415 COLL VENOUS BLD VENIPUNCTURE: CPT | Performed by: EMERGENCY MEDICINE

## 2024-07-08 PROCEDURE — 36415 COLL VENOUS BLD VENIPUNCTURE: CPT | Performed by: STUDENT IN AN ORGANIZED HEALTH CARE EDUCATION/TRAINING PROGRAM

## 2024-07-08 PROCEDURE — 74018 RADEX ABDOMEN 1 VIEW: CPT

## 2024-07-08 PROCEDURE — 76856 US EXAM PELVIC COMPLETE: CPT | Performed by: RADIOLOGY

## 2024-07-08 PROCEDURE — 80053 COMPREHEN METABOLIC PANEL: CPT | Performed by: EMERGENCY MEDICINE

## 2024-07-08 PROCEDURE — 86901 BLOOD TYPING SEROLOGIC RH(D): CPT | Performed by: STUDENT IN AN ORGANIZED HEALTH CARE EDUCATION/TRAINING PROGRAM

## 2024-07-08 PROCEDURE — 76856 US EXAM PELVIC COMPLETE: CPT

## 2024-07-08 PROCEDURE — 81001 URINALYSIS AUTO W/SCOPE: CPT | Performed by: STUDENT IN AN ORGANIZED HEALTH CARE EDUCATION/TRAINING PROGRAM

## 2024-07-08 PROCEDURE — 2550000001 HC RX 255 CONTRASTS: Performed by: EMERGENCY MEDICINE

## 2024-07-08 PROCEDURE — 84702 CHORIONIC GONADOTROPIN TEST: CPT | Performed by: EMERGENCY MEDICINE

## 2024-07-08 RX ORDER — ACETAMINOPHEN 325 MG/1
975 TABLET ORAL ONCE
Status: COMPLETED | OUTPATIENT
Start: 2024-07-08 | End: 2024-07-08

## 2024-07-08 ASSESSMENT — PAIN - FUNCTIONAL ASSESSMENT: PAIN_FUNCTIONAL_ASSESSMENT: 0-10

## 2024-07-08 ASSESSMENT — PAIN DESCRIPTION - ORIENTATION: ORIENTATION: LOWER

## 2024-07-08 ASSESSMENT — LIFESTYLE VARIABLES
EVER HAD A DRINK FIRST THING IN THE MORNING TO STEADY YOUR NERVES TO GET RID OF A HANGOVER: NO
HAVE YOU EVER FELT YOU SHOULD CUT DOWN ON YOUR DRINKING: NO
HAVE PEOPLE ANNOYED YOU BY CRITICIZING YOUR DRINKING: NO
TOTAL SCORE: 0
EVER FELT BAD OR GUILTY ABOUT YOUR DRINKING: NO

## 2024-07-08 ASSESSMENT — PAIN SCALES - GENERAL
PAINLEVEL_OUTOF10: 8
PAINLEVEL_OUTOF10: 10 - WORST POSSIBLE PAIN

## 2024-07-08 ASSESSMENT — COLUMBIA-SUICIDE SEVERITY RATING SCALE - C-SSRS
1. IN THE PAST MONTH, HAVE YOU WISHED YOU WERE DEAD OR WISHED YOU COULD GO TO SLEEP AND NOT WAKE UP?: NO
6. HAVE YOU EVER DONE ANYTHING, STARTED TO DO ANYTHING, OR PREPARED TO DO ANYTHING TO END YOUR LIFE?: NO
2. HAVE YOU ACTUALLY HAD ANY THOUGHTS OF KILLING YOURSELF?: NO

## 2024-07-08 ASSESSMENT — PAIN DESCRIPTION - LOCATION: LOCATION: BACK

## 2024-07-08 ASSESSMENT — PAIN DESCRIPTION - PAIN TYPE: TYPE: ACUTE PAIN

## 2024-07-09 ENCOUNTER — APPOINTMENT (OUTPATIENT)
Dept: CARDIOLOGY | Facility: CLINIC | Age: 29
End: 2024-07-09
Payer: COMMERCIAL

## 2024-07-09 VITALS
WEIGHT: 162 LBS | RESPIRATION RATE: 15 BRPM | DIASTOLIC BLOOD PRESSURE: 82 MMHG | OXYGEN SATURATION: 99 % | TEMPERATURE: 97.7 F | SYSTOLIC BLOOD PRESSURE: 111 MMHG | HEIGHT: 61 IN | HEART RATE: 76 BPM | BODY MASS INDEX: 30.58 KG/M2

## 2024-07-09 DIAGNOSIS — R93.89 ABNORMAL PELVIC ULTRASOUND: Primary | ICD-10-CM

## 2024-07-09 LAB — HOLD SPECIMEN: NORMAL

## 2024-07-09 PROCEDURE — 93975 VASCULAR STUDY: CPT

## 2024-07-09 RX ORDER — PANTOPRAZOLE SODIUM 40 MG/1
40 TABLET, DELAYED RELEASE ORAL 2 TIMES DAILY
Qty: 60 TABLET | Refills: 0 | Status: SHIPPED | OUTPATIENT
Start: 2024-07-09 | End: 2024-08-08

## 2024-07-09 RX ORDER — SUCRALFATE 1 G/1
1 TABLET ORAL
Qty: 120 TABLET | Refills: 0 | Status: SHIPPED | OUTPATIENT
Start: 2024-07-09 | End: 2025-07-09

## 2024-07-09 NOTE — DISCHARGE INSTRUCTIONS
Please return to the ER or seek immediate medical attention if you experience new or worsening abdominal pain, persistent vomiting, persistent diarrhea, black tar stools, fever of 38C (100.4) or higher, chest pain, shortness of breath, or worsening of your current symptoms.    You are welcome back any time. Thank you for entrusting your care to us, I hope we made your visit as pleasant as possible. Wishing you well!    Dr. Edmond

## 2024-07-09 NOTE — ED PROVIDER NOTES
EMERGENCY DEPARTMENT ENCOUNTER      Pt Name: Nargis Strong  MRN: [id number]  Birthdate 1995  Date of evaluation: 7/8/2024  Provider: Moses Edmond DO    CHIEF COMPLAINT       Chief Complaint   Patient presents with    Vaginal Bleeding     Pt states vaginal bleeding since this AM.   Bleeding clots, dark brown/ red blood.       Back Pain    Vomiting Blood     X3 today. Brb with clots      HISTORY OF PRESENT ILLNESS    Nargis Strong is a 28 y.o. year old female who presents to the ER for low  mid abdominal pain, low back pain, bloody emesis, abnormal vaginal bleeding. Bleeding restarted today. Has gone through 8 pads total today, blood has been decreasing. 3 episodes of moderate volume bloody emesis. Period ended 6/28, restarted today.    No chest pain, no SoB, no fevers, no urinary changes, no stool changes  + oral birth control   OB: Dr. Fairchild @ [facility]    PMH: hypothyroidism, iron deficiency anemia, undifferentiated tachycardia  Cardiologist: Dr. Feldman  PSH none  NKDA  G0  No etoh, tobacco, drug use     PAST MEDICAL HISTORY     Past Medical History:   Diagnosis Date    Anemia     Disease of thyroid gland      CURRENT MEDICATIONS       Discharge Medication List as of 7/9/2024 12:26 AM        CONTINUE these medications which have NOT CHANGED    Details   albuterol 90 mcg/actuation inhaler Inhale 2 puffs into the lungs every 6 hours if needed for wheezing, Starting Mon 12/4/2023, Normal      albuterol sulfate (Proair Digihaler) 90 mcg/actuation aero powdr breath act w/sensor inhaler Inhale 2 puffs every 6 hours if needed for wheezing., Starting Mon 12/4/2023, Until Wed 2/7/2024 at 2359, Normal      ascorbic acid (Vitamin C) 1,000 mg tablet Take 1 tablet (1,000 mg) by mouth once daily., Starting Fri 12/2/2022, Historical Med      biotin 1 mg capsule Take by mouth., Historical Med      cholecalciferol (Vitamin D-3) 1,250 mcg (50,000 unit) capsule Take 1 capsule (50,000 Units) by mouth 1 (one) time per week.,  Starting Fri 12/2/2022, Historical Med      ergocalciferol (Vitamin D-2) 1.25 MG (59025 UT) capsule Take 1 capsule (50,000 Units) by mouth every 14 (fourteen) days., Starting Wed 4/3/2024, Normal      ferrous sulfate 325 (65 Fe) MG tablet Take 1 tablet by mouth once daily with breakfast., Historical Med      hydrOXYzine pamoate (VistariL) 50 mg capsule Take 1 capsule (50 mg) by mouth 3 times a day as needed for itching or anxiety for up to 10 days., Starting Wed 4/3/2024, Until Tue 6/11/2024 at 2359, Normal      L-LYSINE ORAL Take by mouth once daily., Historical Med      levothyroxine (Synthroid, Levoxyl) 150 mcg tablet TAKE 1 TABLET (150 MCG) BY MOUTH ONCE DAILY IN THE MORNING BEFORE A MEAL., Normal      loratadine (Claritin) 10 mg tablet Take 1 tablet (10 mg) by mouth once daily., Starting Fri 12/2/2022, Historical Med      magnesium oxide (Mag-Ox) 400 mg (241.3 mg magnesium) tablet Take 1 tablet (400 mg) by mouth once daily. Taking 350mg now, Starting Fri 12/2/2022, Historical Med      NON FORMULARY Collagen powder, Historical Med           SURGICAL HISTORY     History reviewed. No pertinent surgical history.  ALLERGIES     Seasonale (91) [levonorgestrel-ethinyl estrad]  FAMILY HISTORY       Family History   Problem Relation Name Age of Onset    Crohn's disease Mother      Thyroid disease Mother      Diabetes Father      Hyperlipidemia Father      Diabetes Other GRANDMOTHER     Hypertension Other GRANDMOTHER     Thyroid disease Other GRANDMOTHER     Other (CARDIAC DISO) Other GRANDFATHER     Diabetes Other GRANDFATHER     Other (MALIGNANT NEOPLASM OF COLON) Other GRANDFATHER     Other (MALIGNANT NEOPLASM OF THYROID) Other GRANDFATHER      SOCIAL HISTORY       Social History     Tobacco Use    Smoking status: Never    Smokeless tobacco: Never   Vaping Use    Vaping status: Former   Substance Use Topics    Alcohol use: Not Currently    Drug use: Not Currently     Types: Marijuana     PHYSICAL EXAM  (up to 7 for  level 4, 8 or more for level 5)     ED Triage Vitals [07/08/24 1856]   Temperature Heart Rate Respirations BP   36.5 °C (97.7 °F) 84 20 121/86      Pulse Ox Temp Source Heart Rate Source Patient Position   98 % Temporal Monitor Sitting      BP Location FiO2 (%)     Right arm --       Physical Exam  Vitals and nursing note reviewed.   Constitutional:       General: She is not in acute distress.     Appearance: Normal appearance. She is not ill-appearing.   HENT:      Head: Normocephalic and atraumatic. No raccoon eyes, Shirley's sign, contusion or laceration.      Jaw: No trismus or malocclusion.      Right Ear: External ear normal.      Left Ear: External ear normal.      Mouth/Throat:      Mouth: Mucous membranes are moist.      Pharynx: Oropharynx is clear.   Eyes:      Extraocular Movements: Extraocular movements intact.      Pupils: Pupils are equal, round, and reactive to light.   Neck:      Trachea: No tracheal deviation.   Cardiovascular:      Rate and Rhythm: Normal rate and regular rhythm.      Pulses: Normal pulses.   Pulmonary:      Effort: No respiratory distress.      Breath sounds: No wheezing, rhonchi or rales.   Chest:      Chest wall: No tenderness.   Abdominal:      General: Abdomen is flat.      Palpations: Abdomen is soft. There is no mass.      Tenderness: There is abdominal tenderness (hypogastric/periumbilical).   Genitourinary:     Vagina: No foreign body. Tenderness and bleeding (Dark blood present, no active hemorrhage) present. No vaginal discharge or lesions.      Comments: Cervical os closed  Musculoskeletal:         General: No tenderness or signs of injury.      Right lower leg: No edema.      Left lower leg: No edema.   Skin:     Coloration: Skin is not jaundiced or pale.      Findings: No petechiae, rash or wound.   Neurological:      Mental Status: She is alert.   Psychiatric:         Speech: Speech normal.         Thought Content: Thought content does not include homicidal or  suicidal ideation.        DIAGNOSTIC RESULTS   LABS:  Labs Reviewed   URINALYSIS WITH REFLEX MICROSCOPIC - Abnormal       Result Value    Color, Urine Colorless (*)     Appearance, Urine Clear      Specific Gravity, Urine 1.012      pH, Urine 6.0      Protein, Urine 10 (TRACE)      Glucose, Urine Normal      Blood, Urine OVER (3+) (*)     Ketones, Urine 10 (1+) (*)     Bilirubin, Urine NEGATIVE      Urobilinogen, Urine Normal      Nitrite, Urine NEGATIVE      Leukocyte Esterase, Urine 25 Jean-Claude/µL (*)    MICROSCOPIC ONLY, URINE - Abnormal    WBC, Urine 6-10 (*)     RBC, Urine >20 (*)     Squamous Epithelial Cells, Urine 1-9 (SPARSE)      Bacteria, Urine 1+ (*)    HCG, URINE, QUALITATIVE - Normal    HCG, Urine NEGATIVE     LIPASE - Normal    Lipase 23      Narrative:     Venipuncture immediately after or during the administration of Metamizole may lead to falsely low results. Testing should be performed immediately prior to Metamizole dosing.   COMPREHENSIVE METABOLIC PANEL - Normal    Glucose 80      Sodium 136      Potassium 3.6      Chloride 104      Bicarbonate 25      Anion Gap 11      Urea Nitrogen 12      Creatinine 0.57      eGFR >90      Calcium 8.7      Albumin 3.8      Alkaline Phosphatase 60      Total Protein 7.4      AST 14      Bilirubin, Total 0.3      ALT 12     HUMAN CHORIONIC GONADOTROPIN, SERUM QUANTITATIVE - Normal    HCG, Beta-Quantitative <2      Narrative:      Total HCG measurement is performed using the Brian [employer] Access   Immunoassay which detects intact HCG and free beta HCG subunit.    This test is not indicated for use as a tumor marker.   HCG testing is performed using a different test methodology at [facility] than other [facility]. Direct result comparison   should only be made within the same method.       CBC WITH AUTO DIFFERENTIAL    WBC 5.6      nRBC 0.0      RBC 4.40      Hemoglobin 12.9      Hematocrit 39.7      MCV 90      MCH 29.3      MCHC 32.5       RDW 13.2      Platelets 327      Neutrophils % 56.5      Immature Granulocytes %, Automated 0.2      Lymphocytes % 26.5      Monocytes % 13.8      Eosinophils % 2.1      Basophils % 0.9      Neutrophils Absolute 3.16      Immature Granulocytes Absolute, Automated 0.01      Lymphocytes Absolute 1.48      Monocytes Absolute 0.77      Eosinophils Absolute 0.12      Basophils Absolute 0.05     TYPE AND SCREEN    ABO TYPE A      Rh TYPE POS      ANTIBODY SCREEN NEG     URINE GRAY TUBE     All other labs were within normal range or not returned as of this dictation.  Imaging  US PELVIS TRANSABDOMINAL WITH TRANSVAGINAL   Final Result   1. No evidence of ovarian torsion.   2. 3.2 x 1.5 x 2.9 cm left adnexal cystic lesion with questionable   peripheral nodularity. This may reflect a collapsed follicular cyst,   however questionable nodularity raises the possibility of ovarian   neoplasm. 6-12 week follow-up ultrasound, MRI, and/or OBGYN   evaluation is suggested.        MACRO:   Critical Finding:  See findings. Notification was initiated on   7/9/2024 at 12:08 am by  Phillip Lind.  (**-YCF-**)   Instructions:        Signed by: Phillip Lind 7/9/2024 12:12 AM   Dictation workstation:   [id number]      CT angio abdomen pelvis w and or wo IV IV contrast   Final Result   1. No acute abdominal or pelvic process. No active contrast   extravasation to localize gastrointestinal hemorrhage.   2. 2 mm nonobstructing left lower pole renal calculus.   3.  2.8 cm left adnexal cystic lesion. Please see concurrent   ultrasound for additional details.   4. 0.8 cm ground-glass nodule within the left lower lobe which is   unchanged in size from 07/13/2023. Consider CT chest every 2 years   until 5 years.(Ruslan Handy et al., Guidelines for management of   incidental pulmonary nodules detected on CT images: From the   Fleischner Society 2017, Radiology. 2017 Jul;284 (1):228-243.)        MACRO:   Critical Finding:  See  findings. Notification was initiated on   7/8/2024 at 11:56 pm by  Phillip Lind.  (**-YCF-**)   Instructions:        Signed by: Phillip Lind 7/9/2024 12:11 AM   Dictation workstation:   [id number]      XR abdomen 1 view   Final Result   Enteric tube side port is in the gastric body and the tip is in the   antrum.             MACRO:   None        Signed by: Carlos Anderson 7/8/2024 10:50 PM   Dictation workstation:   [id number]         Procedure  Procedures  EMERGENCY DEPARTMENT COURSE/MDM:   Medical Decision Making    Vitals:    Vitals:    07/08/24 2300 07/08/24 2330 07/09/24 0000 07/09/24 0030   BP: 100/82 104/69 111/75 111/82   BP Location:       Patient Position:       Pulse: 68 71 70 76   Resp: 15 14 16 15   Temp:       TempSrc:       SpO2: 99% 98% 98% 99%   Weight:       Height:         Nargis Strong is a female 28 y.o. who presents to the ER for hematemesis and abnormal uterine bleeding. On arrival the patients vital signs were: Afebrile, Reguar HR, Normotensive, Regular RR, and Oxygenating well on room air. History obtained from: patient.  Acetaminophen provided for analgesia  ED Course as of 07/09/24 0201   Mon Jul 08, 2024 2204 Comprehensive metabolic panel  Normoglycemic, no acute electrolyte or hepatorenal abnormality [CB]   2204 CBC with Differential  No acute leukocytosis, leukopenia, thrombocytosis, thrombocytopenia, or anemia [CB]   2204 Lipase  Not greater than 3 times upper normal limit doubt pancreatitis [CB]   2204 Human Chorionic Gonadotropin, Serum Quantitative [CB]   2204 Human Chorionic Gonadotropin, Serum Quantitative  Not elevated, not pregnant [CB]   2254 Microscopic Only, Urine(!)  Squamous cells present, 1+ bacteria however no significant middle leukocyte esterase, nitrite negative, blood positive, consistent with contamination,  not UTI [CB]   Tue Jul 09, 2024   0021 CT angio abdomen pelvis w and or wo IV IV contrast  IMPRESSION:  1. No acute abdominal or pelvic process.  No active contrast  extravasation to localize gastrointestinal hemorrhage.  2. 2 mm nonobstructing left lower pole renal calculus.  3.  2.8 cm left adnexal cystic lesion. Please see concurrent  ultrasound for additional details.  4. 0.8 cm ground-glass nodule within the left lower lobe which is  unchanged in size from 07/13/2023. Consider CT chest every 2 years  until 5 years.     [CB]   0022 US PELVIS TRANSABDOMINAL WITH TRANSVAGINAL  1. No evidence of ovarian torsion.  2. 3.2 x 1.5 x 2.9 cm left adnexal cystic lesion with questionable  peripheral nodularity. This may reflect a collapsed follicular cyst,  however questionable nodularity raises the possibility of ovarian  neoplasm. 6-12 week follow-up ultrasound, MRI, and/or OBGYN  evaluation is suggested.       [CB]      ED Course User Index  [CB] Moses Edmond DO         Diagnoses as of 07/09/24 0201   Hematemesis with nausea   Abnormal uterine bleeding       External Records Reviewed: I reviewed recent and relevant outside records including inpatient notes, outpatient records  Prescription Drug Consideration: Protonix and Carafate prescribed for potential GI ulcer    Shared decision making for disposition  Patient and/or patient´s representative was counseled regarding labs, imaging, likely diagnosis. All questions were answered. Recommendation was made   for discharge home. The patient agreed and was discharged home in stable condition with appropriate relevant educational materials. Return precautions were provided which included new or worsening abdominal pain, persistent vomiting, persistent diarrhea, black tar stools, fever of 38C (100.4) or higher, chest pain, shortness of breath, or worsening of your current symptoms..     ED Medications administered this visit:    Medications   iohexol (OMNIPaque) 350 mg iodine/mL solution 90 mL (90 mL intravenous Given 7/8/24 8978)   acetaminophen (Tylenol) tablet 975 mg (975 mg oral Given 7/8/24 2317)       New  Prescriptions from this visit:    Discharge Medication List as of 7/9/2024 12:26 AM        START taking these medications    Details   pantoprazole (ProtoNix) 40 mg EC tablet Take 1 tablet (40 mg) by mouth 2 times a day. Do not crush, chew, or split., Starting Tue 7/9/2024, Until Thu 8/8/2024, Normal      sucralfate (Carafate) 1 gram tablet Take 1 tablet (1 g) by mouth 4 times a day before meals., Starting Tue 7/9/2024, Until Wed 7/9/2025, Normal             Follow-up:  [place]  [address] [facility] [id number] [phone]              Final Impression:   1. Hematemesis with nausea    2. Abnormal uterine bleeding          Please excuse any misspellings or unintended errors related to the Dragon speech recognition software used to dictate this note.    I reviewed the case with the attending ED physician. The attending ED physician agrees with the plan.      Moses Edmond DO  Resident  07/09/24 0201

## 2024-07-10 ENCOUNTER — APPOINTMENT (OUTPATIENT)
Dept: RADIOLOGY | Facility: HOSPITAL | Age: 29
End: 2024-07-10
Payer: COMMERCIAL

## 2024-07-10 ENCOUNTER — APPOINTMENT (OUTPATIENT)
Dept: CARDIOLOGY | Facility: CLINIC | Age: 29
End: 2024-07-10
Payer: COMMERCIAL

## 2024-07-10 ENCOUNTER — APPOINTMENT (OUTPATIENT)
Dept: CARDIOLOGY | Facility: HOSPITAL | Age: 29
End: 2024-07-10
Payer: COMMERCIAL

## 2024-07-24 ENCOUNTER — APPOINTMENT (OUTPATIENT)
Dept: PRIMARY CARE | Facility: CLINIC | Age: 29
End: 2024-07-24
Payer: COMMERCIAL

## 2024-07-24 ENCOUNTER — APPOINTMENT (OUTPATIENT)
Dept: RADIOLOGY | Facility: CLINIC | Age: 29
End: 2024-07-24
Payer: COMMERCIAL

## 2024-07-24 ENCOUNTER — APPOINTMENT (OUTPATIENT)
Dept: CARDIOLOGY | Facility: CLINIC | Age: 29
End: 2024-07-24
Payer: COMMERCIAL

## 2024-07-24 VITALS
BODY MASS INDEX: 34.44 KG/M2 | SYSTOLIC BLOOD PRESSURE: 119 MMHG | DIASTOLIC BLOOD PRESSURE: 83 MMHG | WEIGHT: 182.4 LBS | HEIGHT: 61 IN | HEART RATE: 83 BPM

## 2024-07-24 DIAGNOSIS — E66.9 CLASS 1 OBESITY WITHOUT SERIOUS COMORBIDITY WITH BODY MASS INDEX (BMI) OF 34.0 TO 34.9 IN ADULT, UNSPECIFIED OBESITY TYPE: Primary | ICD-10-CM

## 2024-07-24 DIAGNOSIS — E06.3 HASHIMOTO'S DISEASE: ICD-10-CM

## 2024-07-24 DIAGNOSIS — E88.819 INSULIN RESISTANCE: Primary | ICD-10-CM

## 2024-07-24 DIAGNOSIS — J30.9 ALLERGIC RHINITIS, UNSPECIFIED SEASONALITY, UNSPECIFIED TRIGGER: ICD-10-CM

## 2024-07-24 DIAGNOSIS — E06.3 HYPOTHYROIDISM DUE TO HASHIMOTO'S THYROIDITIS: ICD-10-CM

## 2024-07-24 DIAGNOSIS — R00.2 HEART PALPITATIONS: ICD-10-CM

## 2024-07-24 DIAGNOSIS — E03.8 HYPOTHYROIDISM DUE TO HASHIMOTO'S THYROIDITIS: ICD-10-CM

## 2024-07-24 DIAGNOSIS — Z88.9 H/O SEASONAL ALLERGIES: ICD-10-CM

## 2024-07-24 DIAGNOSIS — F41.8 ANXIETY ABOUT HEALTH: ICD-10-CM

## 2024-07-24 DIAGNOSIS — E55.9 VITAMIN D DEFICIENCY: ICD-10-CM

## 2024-07-24 PROBLEM — R87.619 ATYPICAL GLANDULAR CELLS ON CERVICAL PAP SMEAR: Status: ACTIVE | Noted: 2024-07-24

## 2024-07-24 PROCEDURE — RXMED WILLOW AMBULATORY MEDICATION CHARGE

## 2024-07-24 PROCEDURE — 99214 OFFICE O/P EST MOD 30 MIN: CPT | Performed by: INTERNAL MEDICINE

## 2024-07-24 PROCEDURE — 1036F TOBACCO NON-USER: CPT | Performed by: INTERNAL MEDICINE

## 2024-07-24 PROCEDURE — 3008F BODY MASS INDEX DOCD: CPT | Performed by: INTERNAL MEDICINE

## 2024-07-24 RX ORDER — TIRZEPATIDE 2.5 MG/.5ML
2.5 INJECTION, SOLUTION SUBCUTANEOUS
Qty: 6 ML | Refills: 0 | Status: SHIPPED | OUTPATIENT
Start: 2024-07-28

## 2024-07-24 RX ORDER — METFORMIN HYDROCHLORIDE 500 MG/1
500 TABLET ORAL
Qty: 180 TABLET | Refills: 1 | Status: SHIPPED | OUTPATIENT
Start: 2024-07-24

## 2024-07-24 RX ORDER — LANOLIN ALCOHOL/MO/W.PET/CERES
1000 CREAM (GRAM) TOPICAL DAILY
COMMUNITY
Start: 2024-07-10

## 2024-07-24 NOTE — PROGRESS NOTES
Assessment/Plan   Problem List Items Addressed This Visit       Hypothyroidism    Anxiety about health    H/O seasonal allergies    Hashimoto's disease    Allergic rhinitis    Vitamin D deficiency    Heart palpitations     Other Visit Diagnoses       Class 1 obesity without serious comorbidity with body mass index (BMI) of 34.0 to 34.9 in adult, unspecified obesity type    -  Primary    Relevant Medications    tirzepatide (Mounjaro) 2.5 mg/0.5 mL pen injector (Start on 7/28/2024)    Other Relevant Orders    Cortisol    ACTH        TSH is more than 3 means further modification in the dose may be required I defer it to the endocrinologist when she is going to see  Rule out Cushing's syndrome  Labs as ordered  Weight loss Mounjaro will be appropriate medications if it is approved and accepted by the insurance  If Mounjaro is not accepted Wegovy could be tried  If these medications are approved then follow-up will be in a month's time otherwise in 3 months    Subjective   Patient ID: Nargis Strong is a 28 y.o. female who presents for Weight Loss (Patient has concerns of weight loss and would like to have blood work ordered.  ).    History reviewed. No pertinent surgical history.   Family History   Problem Relation Name Age of Onset    Crohn's disease Mother      Thyroid disease Mother      Diabetes Father      Hyperlipidemia Father      Diabetes Other GRANDMOTHER     Hypertension Other GRANDMOTHER     Thyroid disease Other GRANDMOTHER     Other (CARDIAC DISO) Other GRANDFATHER     Diabetes Other GRANDFATHER     Other (MALIGNANT NEOPLASM OF COLON) Other GRANDFATHER     Other (MALIGNANT NEOPLASM OF THYROID) Other GRANDFATHER       Social History     Socioeconomic History    Marital status:      Spouse name: Not on file    Number of children: Not on file    Years of education: Not on file    Highest education level: Not on file   Occupational History    Not on file   Tobacco Use    Smoking status: Never    Smokeless  tobacco: Never   Vaping Use    Vaping status: Former   Substance and Sexual Activity    Alcohol use: Not Currently    Drug use: Not Currently     Types: Marijuana    Sexual activity: Not on file   Other Topics Concern    Not on file   Social History Narrative    Not on file     Social Determinants of Health     Financial Resource Strain: Not on file   Food Insecurity: Not on file   Transportation Needs: Not on file   Physical Activity: Not on file   Stress: Not on file   Social Connections: Not on file   Intimate Partner Violence: Not on file   Housing Stability: Not on file      Contrave [naltrexone-bupropion] and Seasonale (91) [levonorgestrel-ethinyl estrad]   Current Outpatient Medications   Medication Sig Dispense Refill    albuterol 90 mcg/actuation inhaler Inhale 2 puffs into the lungs every 6 hours if needed for wheezing 8.5 g 0    ascorbic acid (Vitamin C) 1,000 mg tablet Take 1 tablet (1,000 mg) by mouth 3 (three) times a week.      biotin 1 mg capsule Take by mouth.      cholecalciferol (Vitamin D-3) 1,250 mcg (50,000 unit) capsule Take 1 capsule (50,000 Units) by mouth 1 (one) time per week.      cyanocobalamin (Vitamin B-12) 1,000 mcg tablet Take 1 tablet (1,000 mcg) by mouth once daily.      ferrous sulfate 325 (65 Fe) MG tablet Take 1 tablet by mouth once daily with breakfast.      L-LYSINE ORAL Take by mouth once daily.      levothyroxine (Synthroid, Levoxyl) 150 mcg tablet TAKE 1 TABLET (150 MCG) BY MOUTH ONCE DAILY IN THE MORNING BEFORE A MEAL. 90 tablet 1    loratadine (Claritin) 10 mg tablet Take 1 tablet (10 mg) by mouth once daily.      magnesium oxide (Mag-Ox) 400 mg (241.3 mg magnesium) tablet Take 1 tablet (400 mg) by mouth once daily. Taking 350mg now      pantoprazole (ProtoNix) 40 mg EC tablet Take 1 tablet (40 mg) by mouth 2 times a day. Do not crush, chew, or split. (Patient taking differently: Take 1 tablet (40 mg) by mouth 2 times a day as needed (Acid reflux). Do not crush, chew, or  "split.) 60 tablet 0    albuterol sulfate (Proair Digihaler) 90 mcg/actuation aero powdr breath act w/sensor inhaler Inhale 2 puffs every 6 hours if needed for wheezing. (Patient not taking: Reported on 6/11/2024) 1 each 1    hydrOXYzine pamoate (VistariL) 50 mg capsule Take 1 capsule (50 mg) by mouth 3 times a day as needed for itching or anxiety for up to 10 days. 30 capsule 0    [START ON 7/28/2024] tirzepatide (Mounjaro) 2.5 mg/0.5 mL pen injector Inject 2.5 mg under the skin 1 (one) time per week. 6 mL 0     No current facility-administered medications for this visit.      Vitals:    07/24/24 1008   BP: 119/83   BP Location: Left arm   Patient Position: Sitting   Pulse: 83   Weight: 82.7 kg (182 lb 6.4 oz)   Height: 1.549 m (5' 1\")      Problem List Items Addressed This Visit       Hypothyroidism    Anxiety about health    H/O seasonal allergies    Hashimoto's disease    Allergic rhinitis    Vitamin D deficiency    Heart palpitations     Other Visit Diagnoses       Class 1 obesity without serious comorbidity with body mass index (BMI) of 34.0 to 34.9 in adult, unspecified obesity type    -  Primary    Relevant Medications    tirzepatide (Mounjaro) 2.5 mg/0.5 mL pen injector (Start on 7/28/2024)    Other Relevant Orders    Cortisol    ACTH           Orders Placed This Encounter   Procedures    Cortisol     Standing Status:   Future     Standing Expiration Date:   7/24/2025     Order Specific Question:   Release result to [employer]     Answer:   Immediate [1]    ACTH     Standing Status:   Future     Standing Expiration Date:   7/24/2025     Order Specific Question:   Release result to [employer]     Answer:   Immediate [1]        HPI  Came for follow-up  She has not been able to lose weight  She was recently in the hospital because of concern of miscarriage and bleeding but she is okay  She said she wanted to have checkup cortisol level to rule out Cushing's  She has not been able to lose weight  She is a nurse by " "profession  ROS  Palpitation is under control  Otherwise she feels that she has not been losing weight and wanted to lose weight but she has no symptoms referable to any system  She has made the appointment with endocrinologist which is coming up in a month  She wanted to check cortisol level  Past medical history reviewed  Social and family history reviewed  Allergies and medications reviewed  Recent labs reviewed  Vital signs reviewed      PHYSICAL EXAM  There is some morning of the face there is some buffalo hump  Cardiovascular chest abdominal neurological examination normal      No results found for: \"PR1\", \"BMPR1A\", \"CMPLAS\", \"NK9DJSMV\", \"KPSAT\"   Lab Results   Component Value Date    CHOL 156 01/10/2024    LDLCALC 89 01/10/2024    CHHDL 3.4 01/10/2024                "

## 2024-07-26 ENCOUNTER — APPOINTMENT (OUTPATIENT)
Dept: RADIOLOGY | Facility: CLINIC | Age: 29
End: 2024-07-26
Payer: COMMERCIAL

## 2024-07-26 ENCOUNTER — PHARMACY VISIT (OUTPATIENT)
Dept: PHARMACY | Facility: CLINIC | Age: 29
End: 2024-07-26
Payer: COMMERCIAL

## 2024-07-31 ENCOUNTER — APPOINTMENT (OUTPATIENT)
Dept: CARDIOLOGY | Facility: CLINIC | Age: 29
End: 2024-07-31
Payer: COMMERCIAL

## 2024-08-05 DIAGNOSIS — E03.8 HYPOTHYROIDISM DUE TO HASHIMOTO'S THYROIDITIS: ICD-10-CM

## 2024-08-05 DIAGNOSIS — E06.3 HYPOTHYROIDISM DUE TO HASHIMOTO'S THYROIDITIS: ICD-10-CM

## 2024-08-05 DIAGNOSIS — R79.89 ABNORMAL TSH: ICD-10-CM

## 2024-08-05 PROCEDURE — RXMED WILLOW AMBULATORY MEDICATION CHARGE

## 2024-08-05 RX ORDER — LEVOTHYROXINE SODIUM 150 UG/1
150 TABLET ORAL DAILY
Qty: 90 TABLET | Refills: 0 | Status: SHIPPED | OUTPATIENT
Start: 2024-08-05

## 2024-08-05 NOTE — TELEPHONE ENCOUNTER
Can you please send in my Synthroid to [facility] pharmacy. Thank you     LOV  7/24/24 with MSK  POV  none

## 2024-08-23 ENCOUNTER — PHARMACY VISIT (OUTPATIENT)
Dept: PHARMACY | Facility: CLINIC | Age: 29
End: 2024-08-23
Payer: COMMERCIAL

## 2024-08-26 ENCOUNTER — APPOINTMENT (OUTPATIENT)
Dept: ALLERGY | Facility: CLINIC | Age: 29
End: 2024-08-26
Payer: COMMERCIAL

## 2024-09-05 ENCOUNTER — LAB (OUTPATIENT)
Dept: LAB | Facility: LAB | Age: 29
End: 2024-09-05
Payer: COMMERCIAL

## 2024-09-05 DIAGNOSIS — E66.9 CLASS 1 OBESITY WITHOUT SERIOUS COMORBIDITY WITH BODY MASS INDEX (BMI) OF 34.0 TO 34.9 IN ADULT, UNSPECIFIED OBESITY TYPE: ICD-10-CM

## 2024-09-05 LAB — CORTIS SERPL-MCNC: 11.4 UG/DL (ref 2.5–20)

## 2024-09-05 PROCEDURE — 82533 TOTAL CORTISOL: CPT

## 2024-09-05 PROCEDURE — 36415 COLL VENOUS BLD VENIPUNCTURE: CPT

## 2024-09-05 PROCEDURE — 82024 ASSAY OF ACTH: CPT

## 2024-09-06 LAB — ACTH PLAS-MCNC: 10.6 PG/ML (ref 7.2–63.3)

## 2024-10-02 DIAGNOSIS — L70.9 ACNE, UNSPECIFIED ACNE TYPE: ICD-10-CM

## 2024-10-02 DIAGNOSIS — F41.8 ANXIETY ABOUT HEALTH: ICD-10-CM

## 2024-10-02 RX ORDER — TRETINOIN 1 MG/G
CREAM TOPICAL NIGHTLY
COMMUNITY

## 2024-10-04 NOTE — TELEPHONE ENCOUNTER
Pt has a scheduled OV on 10/9 she is wondering if it can be a Telehealth to discuss meds or does she need to come in

## 2024-10-07 RX ORDER — HYDROXYZINE PAMOATE 50 MG/1
50 CAPSULE ORAL 3 TIMES DAILY PRN
Qty: 30 CAPSULE | Refills: 0 | OUTPATIENT
Start: 2024-10-07 | End: 2024-10-17

## 2024-10-07 RX ORDER — TRETINOIN 1 MG/G
CREAM TOPICAL NIGHTLY
Qty: 45 G | Refills: 1 | OUTPATIENT
Start: 2024-10-07

## 2024-10-09 ENCOUNTER — APPOINTMENT (OUTPATIENT)
Dept: PRIMARY CARE | Facility: CLINIC | Age: 29
End: 2024-10-09
Payer: COMMERCIAL

## 2024-10-16 ENCOUNTER — TELEMEDICINE (OUTPATIENT)
Dept: PRIMARY CARE | Facility: CLINIC | Age: 29
End: 2024-10-16
Payer: COMMERCIAL

## 2024-10-16 VITALS — SYSTOLIC BLOOD PRESSURE: 118 MMHG | HEART RATE: 112 BPM | OXYGEN SATURATION: 97 % | DIASTOLIC BLOOD PRESSURE: 76 MMHG

## 2024-10-16 DIAGNOSIS — J45.20 MILD INTERMITTENT ASTHMA, UNSPECIFIED WHETHER COMPLICATED (HHS-HCC): ICD-10-CM

## 2024-10-16 DIAGNOSIS — U07.1 COVID-19 VIRUS INFECTION: Primary | ICD-10-CM

## 2024-10-16 PROBLEM — M21.371 RIGHT FOOT DROP: Status: ACTIVE | Noted: 2023-07-28

## 2024-10-16 PROBLEM — S09.90XA CLOSED HEAD INJURY: Status: ACTIVE | Noted: 2023-07-28

## 2024-10-16 PROCEDURE — 1036F TOBACCO NON-USER: CPT | Performed by: INTERNAL MEDICINE

## 2024-10-16 PROCEDURE — 99441 PR PHYS/QHP TELEPHONE EVALUATION 5-10 MIN: CPT | Performed by: INTERNAL MEDICINE

## 2024-10-16 RX ORDER — METHYLPREDNISOLONE 4 MG/1
TABLET ORAL
Qty: 21 TABLET | Refills: 0 | Status: SHIPPED | OUTPATIENT
Start: 2024-10-16 | End: 2024-10-23

## 2024-10-16 RX ORDER — AZITHROMYCIN 250 MG/1
TABLET, FILM COATED ORAL
Qty: 6 TABLET | Refills: 0 | Status: SHIPPED | OUTPATIENT
Start: 2024-10-16 | End: 2024-10-21

## 2024-10-16 ASSESSMENT — PATIENT HEALTH QUESTIONNAIRE - PHQ9
SUM OF ALL RESPONSES TO PHQ9 QUESTIONS 1 AND 2: 0
2. FEELING DOWN, DEPRESSED OR HOPELESS: NOT AT ALL
1. LITTLE INTEREST OR PLEASURE IN DOING THINGS: NOT AT ALL

## 2024-10-16 NOTE — PROGRESS NOTES
Assessment/Plan   Problem List Items Addressed This Visit       COVID-19 virus infection - Primary    Mild intermittent asthma    Relevant Medications    azithromycin (Zithromax) 250 mg tablet    methylPREDNISolone (Medrol Dospak) 4 mg tablets     As per her request antibiotic and steroid is being given  Advised that if she gets worse she should go to the ER  She did not want Paxlovid  Symptomatic measure  She should remain off until Sunday may go back to work on Monday  Follow-up as needed  Subjective   Patient ID: Nargis Strong is a 28 y.o. female who presents for Sinusitis (Patient c/o cough, SOB, weakness, chest tightness, and phlegm.  Symptoms present for 3 days now.  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  States that she took a covid test and it had 2 faint lines on it.  ).    History reviewed. No pertinent surgical history.   Family History   Problem Relation Name Age of Onset    Crohn's disease Mother      Thyroid disease Mother      Diabetes Father Dad     Hyperlipidemia Father Dad     Diabetes Other GRANDMOTHER     Hypertension Other GRANDMOTHER     Thyroid disease Other GRANDMOTHER     Other (CARDIAC DISO) Other GRANDFATHER     Diabetes Other GRANDFATHER     Other (MALIGNANT NEOPLASM OF COLON) Other GRANDFATHER     Other (MALIGNANT NEOPLASM OF THYROID) Other GRANDFATHER       Social History     Socioeconomic History    Marital status:      Spouse name: Not on file    Number of children: Not on file    Years of education: Not on file    Highest education level: Not on file   Occupational History    Not on file   Tobacco Use    Smoking status: Never    Smokeless tobacco: Never   Vaping Use    Vaping status: Former   Substance and Sexual Activity    Alcohol use: Never    Drug use: Never     Types: Marijuana    Sexual activity: Yes   Other Topics Concern    Not on file   Social History Narrative    Not on file     Social Drivers of Health     Financial Resource  Strain: Not on file   Food Insecurity: Not on file   Transportation Needs: Not on file   Physical Activity: Not on file   Stress: Not on file   Social Connections: Not on file   Intimate Partner Violence: Not on file   Housing Stability: Not on file      Contrave [naltrexone-bupropion] and Seasonale (91) [levonorgestrel-ethinyl estrad]   Current Outpatient Medications   Medication Sig Dispense Refill    albuterol 90 mcg/actuation inhaler Inhale 2 puffs into the lungs every 6 hours if needed for wheezing 8.5 g 0    ascorbic acid (Vitamin C) 1,000 mg tablet Take 1 tablet (1,000 mg) by mouth 3 (three) times a week.      biotin 1 mg capsule Take by mouth.      cholecalciferol (Vitamin D-3) 1,250 mcg (50,000 unit) capsule Take 1 capsule (50,000 Units) by mouth 1 (one) time per week.      cyanocobalamin (Vitamin B-12) 1,000 mcg tablet Take 1 tablet (1,000 mcg) by mouth once daily.      ferrous sulfate 325 (65 Fe) MG tablet Take 1 tablet (325 mg) by mouth once daily with breakfast.      L-LYSINE ORAL Take by mouth once daily.      levothyroxine (Synthroid, Levoxyl) 150 mcg tablet Take 1 tablet (150 mcg) by mouth early in the morning.. Take on an empty stomach at the same time each day, either 30 to 60 minutes prior to breakfast 90 tablet 0    loratadine (Claritin) 10 mg tablet Take 1 tablet (10 mg) by mouth once daily.      magnesium oxide (Mag-Ox) 400 mg (241.3 mg magnesium) tablet Take 1 tablet (400 mg) by mouth once daily. Taking 350mg now      metFORMIN (Glucophage) 500 mg tablet Take 1 tablet (500 mg) by mouth 2 times daily (morning and late afternoon). 180 tablet 1    tretinoin (Retin-A) 0.1 % cream Apply topically once daily at bedtime.      albuterol sulfate (Proair Digihaler) 90 mcg/actuation aero powdr breath act w/sensor inhaler Inhale 2 puffs every 6 hours if needed for wheezing. (Patient not taking: Reported on 6/11/2024) 1 each 1    azithromycin (Zithromax) 250 mg tablet Take 2 tablets (500 mg) by mouth once  "daily for 1 day, THEN 1 tablet (250 mg) once daily for 4 days. Take 2 tabs (500 mg) by mouth today, than 1 daily for 4 days.. 6 tablet 0    hydrOXYzine pamoate (VistariL) 50 mg capsule Take 1 capsule (50 mg) by mouth 3 times a day as needed for itching or anxiety for up to 10 days. 30 capsule 0    methylPREDNISolone (Medrol Dospak) 4 mg tablets Take as directed on package. 21 tablet 0    pantoprazole (ProtoNix) 40 mg EC tablet Take 1 tablet (40 mg) by mouth 2 times a day. Do not crush, chew, or split. (Patient taking differently: Take 1 tablet (40 mg) by mouth 2 times a day as needed (Acid reflux). Do not crush, chew, or split.) 60 tablet 0    tirzepatide (Mounjaro) 2.5 mg/0.5 mL pen injector Inject 2.5 mg under the skin 1 (one) time per week. (Patient not taking: Reported on 10/16/2024) 6 mL 0     No current facility-administered medications for this visit.      Vitals:    10/16/24 1524   BP: 118/76   Pulse: (!) 112   SpO2: 97%      Problem List Items Addressed This Visit       COVID-19 virus infection - Primary    Mild intermittent asthma    Relevant Medications    azithromycin (Zithromax) 250 mg tablet    methylPREDNISolone (Medrol Dospak) 4 mg tablets      No orders of the defined types were placed in this encounter.       HPI  This is a 28-year-old female who presented through the telephone that she is exposed to COVID and testing was 2 faint line may be consistent with COVID infection but having asthma it is exacerbated and she gets bronchitis every year with asthma exacerbation  She is coughing and wheezing and was wondering to have antibiotic and steroid and did not want Paxlovid    ROS as mentioned above  Past medical history reviewed  Social and family history reviewed  Allergies and medications reviewed  Recent labs reviewed  Vital signs not done    PHYSICAL EXAM  Limited except verbal conversation she was having some coughing spell      No results found for: \"PR1\", \"BMPR1A\", \"CMPLAS\", \"HL2RFBHW\", \"KPSAT\" "   Lab Results   Component Value Date    CHOL 156 01/10/2024    LDLCALC 89 01/10/2024    CHHDL 3.4 01/10/2024

## 2024-10-29 DIAGNOSIS — F41.8 ANXIETY ABOUT HEALTH: ICD-10-CM

## 2024-10-29 DIAGNOSIS — E66.811 CLASS 1 OBESITY WITHOUT SERIOUS COMORBIDITY WITH BODY MASS INDEX (BMI) OF 34.0 TO 34.9 IN ADULT, UNSPECIFIED OBESITY TYPE: ICD-10-CM

## 2024-10-29 DIAGNOSIS — L70.8 OTHER ACNE: ICD-10-CM

## 2024-10-30 ENCOUNTER — TELEPHONE (OUTPATIENT)
Dept: PRIMARY CARE | Facility: CLINIC | Age: 29
End: 2024-10-30
Payer: COMMERCIAL

## 2024-10-30 DIAGNOSIS — B00.1 HERPES LABIALIS: Primary | ICD-10-CM

## 2024-10-30 RX ORDER — HYDROXYZINE PAMOATE 50 MG/1
50 CAPSULE ORAL 3 TIMES DAILY PRN
Qty: 30 CAPSULE | Refills: 0 | Status: SHIPPED | OUTPATIENT
Start: 2024-10-30 | End: 2024-11-09

## 2024-10-30 RX ORDER — VALACYCLOVIR HYDROCHLORIDE 1 G/1
2000 TABLET, FILM COATED ORAL 2 TIMES DAILY
Qty: 4 TABLET | Refills: 5 | Status: SHIPPED | OUTPATIENT
Start: 2024-10-30 | End: 2024-10-31 | Stop reason: ALTCHOICE

## 2024-10-30 RX ORDER — TRETINOIN 1 MG/G
CREAM TOPICAL NIGHTLY
Qty: 45 G | Refills: 0 | Status: SHIPPED | OUTPATIENT
Start: 2024-10-30

## 2024-10-30 RX ORDER — TIRZEPATIDE 2.5 MG/.5ML
2.5 INJECTION, SOLUTION SUBCUTANEOUS
Qty: 6 ML | Refills: 0 | Status: SHIPPED | OUTPATIENT
Start: 2024-11-03

## 2024-10-31 DIAGNOSIS — B00.1 HERPES LABIALIS: ICD-10-CM

## 2024-10-31 RX ORDER — VALACYCLOVIR HYDROCHLORIDE 1 G/1
2000 TABLET, FILM COATED ORAL 2 TIMES DAILY
Qty: 4 TABLET | Refills: 5 | Status: SHIPPED | OUTPATIENT
Start: 2024-10-31 | End: 2024-11-01

## 2024-11-05 ENCOUNTER — TELEPHONE (OUTPATIENT)
Dept: PRIMARY CARE | Facility: CLINIC | Age: 29
End: 2024-11-05
Payer: COMMERCIAL

## 2024-11-05 NOTE — TELEPHONE ENCOUNTER
Patient said she is going to be out of town for work for at least 2 months - she said she will call back after the new year to get scheduled for an appt

## 2024-11-05 NOTE — TELEPHONE ENCOUNTER
----- Message from Nurse Yelena THOMPSON sent at 11/5/2024  9:20 AM EST -----  Please contact PT for scheduling of follow up with Dr. Gee.  ty

## 2024-11-19 DIAGNOSIS — E06.3 HYPOTHYROIDISM DUE TO HASHIMOTO'S THYROIDITIS: ICD-10-CM

## 2024-11-19 DIAGNOSIS — R79.89 ABNORMAL TSH: ICD-10-CM

## 2024-11-19 PROCEDURE — RXMED WILLOW AMBULATORY MEDICATION CHARGE

## 2024-11-19 RX ORDER — LEVOTHYROXINE SODIUM 150 UG/1
150 TABLET ORAL DAILY
Qty: 90 TABLET | Refills: 0 | Status: CANCELLED | OUTPATIENT
Start: 2024-11-19

## 2024-11-19 RX ORDER — LEVOTHYROXINE SODIUM 150 UG/1
150 TABLET ORAL DAILY
Qty: 90 TABLET | Refills: 0 | Status: SHIPPED | OUTPATIENT
Start: 2024-11-19

## 2024-11-21 ENCOUNTER — PHARMACY VISIT (OUTPATIENT)
Dept: PHARMACY | Facility: CLINIC | Age: 29
End: 2024-11-21
Payer: COMMERCIAL

## 2024-12-03 ENCOUNTER — PATIENT MESSAGE (OUTPATIENT)
Dept: PRIMARY CARE | Facility: CLINIC | Age: 29
End: 2024-12-03
Payer: COMMERCIAL

## 2024-12-04 ENCOUNTER — OFFICE VISIT (OUTPATIENT)
Dept: PRIMARY CARE | Facility: CLINIC | Age: 29
End: 2024-12-04
Payer: COMMERCIAL

## 2024-12-04 ENCOUNTER — PHARMACY VISIT (OUTPATIENT)
Dept: PHARMACY | Facility: CLINIC | Age: 29
End: 2024-12-04
Payer: COMMERCIAL

## 2024-12-04 VITALS — HEART RATE: 79 BPM | SYSTOLIC BLOOD PRESSURE: 100 MMHG | DIASTOLIC BLOOD PRESSURE: 62 MMHG

## 2024-12-04 DIAGNOSIS — L03.039 PARONYCHIA OF GREAT TOE: Primary | ICD-10-CM

## 2024-12-04 PROCEDURE — 99213 OFFICE O/P EST LOW 20 MIN: CPT | Performed by: FAMILY MEDICINE

## 2024-12-04 PROCEDURE — 1036F TOBACCO NON-USER: CPT | Performed by: FAMILY MEDICINE

## 2024-12-04 PROCEDURE — RXMED WILLOW AMBULATORY MEDICATION CHARGE

## 2024-12-04 RX ORDER — SULFAMETHOXAZOLE AND TRIMETHOPRIM 800; 160 MG/1; MG/1
1 TABLET ORAL 2 TIMES DAILY
Qty: 20 TABLET | Refills: 0 | Status: SHIPPED | OUTPATIENT
Start: 2024-12-04 | End: 2024-12-14

## 2024-12-04 ASSESSMENT — ENCOUNTER SYMPTOMS
FEVER: 0
FATIGUE: 0
ACTIVITY CHANGE: 0
HEADACHES: 0
SHORTNESS OF BREATH: 0
DIZZINESS: 0

## 2024-12-04 NOTE — PROGRESS NOTES
Subjective   Patient ID: Nargis Strong is a 29 y.o. female who presents for Sick Visit (Right foot great toe is swollen).    Toe infection   - has had swelling and iritation on the lateral aspect of great toe   - present for approximately 1 week   - treated at  with amoxicillin one tab daily for 3 days   - reports she had no improvement in symptoms on antibiotics  - denies any discharge or drainage   - no fevers/chills, no nausea/vomiting         Review of Systems   Constitutional:  Negative for activity change, fatigue and fever.   Respiratory:  Negative for shortness of breath.    Cardiovascular:  Negative for chest pain.   Neurological:  Negative for dizziness and headaches.       Objective   /62   Pulse 79     Physical Exam  Constitutional:       Appearance: Normal appearance.   Skin:     Comments: Erythematous scabbed lesion at superolateral aspect of R great toe, no palpable fluctuance    Neurological:      Mental Status: She is alert.         Assessment/Plan   Problem List Items Addressed This Visit    None  Visit Diagnoses         Codes    Paronychia of great toe    -  Primary L03.039    stable  - tx with bactrim   - gave precaustions on return for repeat evaluation     Relevant Medications    sulfamethoxazole-trimethoprim (Bactrim DS) 800-160 mg tablet

## 2024-12-04 NOTE — LETTER
December 4, 2024     Patient: Nargis Strong   YOB: 1995   Date of Visit: 12/4/2024       To Whom It May Concern:    Nargis Strong was seen in my clinic on 12/4/2024 at 8:15 am. Please excuse Nargis for her absence from work on this day to make the appointment.    If you have any questions or concerns, please don't hesitate to call.         Sincerely,         Yaya Raygoza MD        CC: No Recipients

## 2024-12-10 ENCOUNTER — OFFICE VISIT (OUTPATIENT)
Dept: URGENT CARE | Age: 29
End: 2024-12-10
Payer: COMMERCIAL

## 2024-12-10 VITALS
HEART RATE: 88 BPM | RESPIRATION RATE: 16 BRPM | DIASTOLIC BLOOD PRESSURE: 83 MMHG | TEMPERATURE: 98.8 F | BODY MASS INDEX: 32.47 KG/M2 | WEIGHT: 172 LBS | SYSTOLIC BLOOD PRESSURE: 105 MMHG | HEIGHT: 61 IN | OXYGEN SATURATION: 98 %

## 2024-12-10 DIAGNOSIS — L60.0 INGROWN NAIL OF GREAT TOE OF LEFT FOOT: ICD-10-CM

## 2024-12-10 DIAGNOSIS — L03.032 PARONYCHIA OF GREAT TOE OF LEFT FOOT: Primary | ICD-10-CM

## 2024-12-10 PROCEDURE — 3008F BODY MASS INDEX DOCD: CPT | Performed by: STUDENT IN AN ORGANIZED HEALTH CARE EDUCATION/TRAINING PROGRAM

## 2024-12-10 PROCEDURE — 99203 OFFICE O/P NEW LOW 30 MIN: CPT | Performed by: STUDENT IN AN ORGANIZED HEALTH CARE EDUCATION/TRAINING PROGRAM

## 2024-12-10 RX ORDER — DOXYCYCLINE 100 MG/1
100 CAPSULE ORAL 2 TIMES DAILY
Qty: 10 CAPSULE | Refills: 0 | Status: SHIPPED | OUTPATIENT
Start: 2024-12-10 | End: 2024-12-16

## 2024-12-10 ASSESSMENT — PAIN SCALES - GENERAL: PAINLEVEL_OUTOF10: 10-WORST PAIN EVER

## 2024-12-10 NOTE — PROGRESS NOTES
Subjective   Patient ID: Nargis Strong is a 29 y.o. female. They present today with a chief complaint of Injury (Big toe on right foot swelling and irritation x 2 weeks. Is currently taking bactrim. ).    History of Present Illness  Nargis is a 29-year-old female who presents to the urgent care for evaluation of left great toe swelling and possible infection.  Patient states 2 weeks ago symptoms developed and she was seen by her primary care provider and prescribed a course of Bactrim.  Patient states she has a few more days of this treatment and feels her symptoms have gradually improved.  Patient is seeking evaluation reassurance that she felt there was purulent drainage this morning.  Patient denies associated fever, interval trauma or injury.  Patient notes some discomfort with pain at times.  No numbness reported.  No other associated symptoms otherwise reported.  Patient is seeking evaluation, second opinion.    Past Medical History  Allergies as of 12/10/2024 - Reviewed 12/10/2024   Allergen Reaction Noted    Contrave [naltrexone-bupropion] Hives, Fever, and Nausea/vomiting 07/24/2024    Seasonale (91) [levonorgestrel-ethinyl estrad] Runny nose 05/31/2023       (Not in a hospital admission)       Past Medical History:   Diagnosis Date    ADHD (attention deficit hyperactivity disorder)     Allergic     Anemia     Anxiety     Asthma     Disease of thyroid gland     Headache        No past surgical history on file.     reports that she has never smoked. She has never used smokeless tobacco. She reports that she does not drink alcohol and does not use drugs.    Review of Systems  A 10-point review of systems was performed, otherwise unremarkable unless stated in the history of present illness.                Objective    Vitals:    12/10/24 1747   BP: 105/83   BP Location: Right arm   Patient Position: Sitting   BP Cuff Size: Large adult   Pulse: 88   Resp: 16   Temp: 37.1 °C (98.8 °F)   TempSrc: Oral   SpO2: 98%  "  Weight: 78 kg (172 lb)   Height: 1.549 m (5' 1\")     No LMP recorded.    Gen: Vitals noted and reviewed, no evidence of acute distress, well developed and afebrile.   Psych: Mood and affect appropriate for setting.  Head/Face: Atraumatic and normocephalic.   Neuro: No focal deficits noted.  Cardiac: Regular rate and rhythm no murmur.   Lungs: Clear to auscultation throughout, No evidence of wheezing, rhonchi or crackles. Good aeration throughout.   Left great toe: Erythema without fluctuant mass to the lateral aspect of the left great toe with ingrown toenail present. No vesicles or pustules noted.  No active purulent drainage noted.  Extremities: Symmetrical, No peripheral edema  Skin: Without evidence of ecchymosis, wounds, or rashes.      Point of Care Test & Imaging Results from this visit  No results found for this visit on 12/10/24.   No results found.    Diagnostic study results (if any) were reviewed by Kristopher Roper DO.    Assessment/Plan   PA attempted digital block: Area was minimally debrided after numbing and hemorrhagic drainage develops without purulence.    Area was cleaned with 2 alcohol swabs and an 11 blade was used to manipulate area with hemorrhagic drainage noted, hemostasis achieved gauze was applied with tape.  No purulent drainage expelled.  This was tolerated well with minimal blood loss without complications.    Allergies, medications, history, and pertinent labs/EKGs/Imaging reviewed by Kristopher Roper DO.     Medical Decision Making  Discussed with the patient symptoms and clinical presentation findings suggestive of left great toe paronychia with likely ingrown toenail.  Would advise close symptom monitoring supportive treatment.  We advised continuing and completing Bactrim treatment and agreed to prescribe doxycycline for an additional 5 days of treatment.  We did provide a referral to podiatry to assess and manage possible ingrown toe nail after acute infection resolves. Follow up " with PCP. We advised seeking immediate emergency medical attention if symptoms fail to improve, worsen or any concerning symptoms arise. Patient voiced full understanding and agreement to plan.      Orders and Diagnoses  Diagnoses and all orders for this visit:  Paronychia of great toe of left foot  -     doxycycline (Monodox) 100 mg capsule; Take 1 capsule (100 mg) by mouth 2 times a day for 5 days. Take with at least 8 ounces (large glass) of water, do not lie down for 30 minutes after  -     Referral to Podiatry; Future  Ingrown nail of great toe of left foot  -     Referral to Podiatry; Future      Medical Admin Record      Patient disposition: Home    Electronically signed by Kristopher Roper DO  7:10 PM

## 2024-12-10 NOTE — Clinical Note
We advised continuing and completing Bactrim treatment and agreed to prescribe doxycycline for an additional 5 days of treatment.  We did provide a referral to podiatry to assess and manage possible ingrown toe nail after acute infection resolves.

## 2024-12-11 PROCEDURE — RXMED WILLOW AMBULATORY MEDICATION CHARGE

## 2024-12-17 LAB — NON-UH HIE THINPREP TIS PAP: ABNORMAL

## 2024-12-18 ENCOUNTER — PHARMACY VISIT (OUTPATIENT)
Dept: PHARMACY | Facility: CLINIC | Age: 29
End: 2024-12-18
Payer: COMMERCIAL

## 2024-12-19 LAB — NON-UH HIE GP HPV REFLEX: NEGATIVE

## 2025-01-16 ENCOUNTER — OFFICE VISIT (OUTPATIENT)
Dept: PRIMARY CARE | Facility: CLINIC | Age: 30
End: 2025-01-16
Payer: COMMERCIAL

## 2025-01-16 VITALS
HEART RATE: 94 BPM | HEIGHT: 61 IN | DIASTOLIC BLOOD PRESSURE: 70 MMHG | BODY MASS INDEX: 32.5 KG/M2 | SYSTOLIC BLOOD PRESSURE: 112 MMHG

## 2025-01-16 DIAGNOSIS — E06.3 HASHIMOTO'S DISEASE: Primary | ICD-10-CM

## 2025-01-16 DIAGNOSIS — J45.901 MODERATE ASTHMA WITH ACUTE EXACERBATION, UNSPECIFIED WHETHER PERSISTENT (HHS-HCC): ICD-10-CM

## 2025-01-16 DIAGNOSIS — Z86.2 HISTORY OF ANEMIA: ICD-10-CM

## 2025-01-16 DIAGNOSIS — J45.20 MILD INTERMITTENT ASTHMA, UNSPECIFIED WHETHER COMPLICATED (HHS-HCC): ICD-10-CM

## 2025-01-16 PROCEDURE — RXMED WILLOW AMBULATORY MEDICATION CHARGE

## 2025-01-16 PROCEDURE — 1036F TOBACCO NON-USER: CPT | Performed by: FAMILY MEDICINE

## 2025-01-16 PROCEDURE — 99214 OFFICE O/P EST MOD 30 MIN: CPT | Performed by: FAMILY MEDICINE

## 2025-01-16 RX ORDER — PREDNISONE 10 MG/1
TABLET ORAL
Qty: 30 TABLET | Refills: 0 | Status: SHIPPED | OUTPATIENT
Start: 2025-01-16 | End: 2025-01-27

## 2025-01-16 RX ORDER — ALBUTEROL SULFATE AND BUDESONIDE 90; 80 UG/1; UG/1
2 AEROSOL, METERED RESPIRATORY (INHALATION) EVERY 6 HOURS PRN
Qty: 32.1 G | Refills: 0 | Status: SHIPPED | OUTPATIENT
Start: 2025-01-16

## 2025-01-16 RX ORDER — ALBUTEROL SULFATE 90 UG/1
INHALANT RESPIRATORY (INHALATION)
Qty: 8.5 G | Refills: 0 | Status: SHIPPED | OUTPATIENT
Start: 2025-01-16

## 2025-01-16 ASSESSMENT — ENCOUNTER SYMPTOMS
HEADACHES: 0
SHORTNESS OF BREATH: 0
DIZZINESS: 0
FATIGUE: 0
FEVER: 0
ACTIVITY CHANGE: 0

## 2025-01-16 NOTE — PROGRESS NOTES
"Subjective   Patient ID: Nargis Strong is a 29 y.o. female who presents for Sick Visit (Wants blood work for thyroid levels and her asthma is acting up).    Thyroid concerns   - reports she has been dealing with fatigue, muscle pain, joint pain   - has been on synthroid for approximatley 10 years   - reports she takes 150mcg daily of synthroid which helps manage her symptoms   - has a history of hashimotos thyroiditis and has been stable on her current dose for approximately 6 months   - reports she has been feeling cold all the time   - fatigue despite adequate sleep   - constipation problems     Asthma symptoms   - reports she has been using her albuterol inhaler much more frequently   - has been having some shortness of breath and wheezing, worse at night   - reports she does use an albuterol inhaler which helps, but she has been requiring it every 3 hours   - has been following with an allergist to help her with allergy shots   - has been dealing with some viral symptoms in addition to her wheezing and tightness          Review of Systems   Constitutional:  Negative for activity change, fatigue and fever.   Respiratory:  Negative for shortness of breath.    Cardiovascular:  Negative for chest pain.   Neurological:  Negative for dizziness and headaches.       Objective   /70   Pulse 94   Ht 1.549 m (5' 1\")   BMI 32.50 kg/m²     Physical Exam  Constitutional:       Appearance: Normal appearance.   Cardiovascular:      Rate and Rhythm: Normal rate and regular rhythm.      Pulses: Normal pulses.      Heart sounds: Normal heart sounds.   Neurological:      Mental Status: She is alert.   Psychiatric:         Mood and Affect: Mood normal.         Behavior: Behavior normal.         Assessment/Plan   Problem List Items Addressed This Visit             ICD-10-CM    Hashimoto's disease - Primary E06.3     Stable   - recheck thyroid levels today   - consider augmenting medication   - f/u 6 months          Relevant " Orders    Tsh With Reflex To Free T4 If Abnormal    T3, free    History of anemia Z86.2     Stable  - check iron levels and blood counts   - f/u 6 months after lab values          Relevant Medications    predniSONE (Deltasone) 10 mg tablet    Other Relevant Orders    Ferritin    Iron and TIBC    CBC and Auto Differential    Mild intermittent asthma J45.20     Stable   - tx with oral prednisone   - f/u PRN          Relevant Medications    albuterol 90 mcg/actuation inhaler    Airsupra 90-80 mcg/actuation inhaler     Other Visit Diagnoses         Codes    Moderate asthma with acute exacerbation, unspecified whether persistent ([facility])     J45.901    Relevant Medications    predniSONE (Deltasone) 10 mg tablet

## 2025-01-17 ENCOUNTER — PHARMACY VISIT (OUTPATIENT)
Dept: PHARMACY | Facility: CLINIC | Age: 30
End: 2025-01-17
Payer: COMMERCIAL

## 2025-01-17 ENCOUNTER — LAB (OUTPATIENT)
Dept: LAB | Facility: LAB | Age: 30
End: 2025-01-17
Payer: COMMERCIAL

## 2025-01-17 DIAGNOSIS — E06.3 HASHIMOTO'S DISEASE: ICD-10-CM

## 2025-01-17 DIAGNOSIS — Z86.2 HISTORY OF ANEMIA: ICD-10-CM

## 2025-01-17 LAB
BASOPHILS # BLD AUTO: 0.07 X10*3/UL (ref 0–0.1)
BASOPHILS NFR BLD AUTO: 1.3 %
EOSINOPHIL # BLD AUTO: 0.23 X10*3/UL (ref 0–0.7)
EOSINOPHIL NFR BLD AUTO: 4.4 %
ERYTHROCYTE [DISTWIDTH] IN BLOOD BY AUTOMATED COUNT: 12.7 % (ref 11.5–14.5)
FERRITIN SERPL-MCNC: 202 NG/ML (ref 8–150)
HCT VFR BLD AUTO: 41.9 % (ref 36–46)
HGB BLD-MCNC: 14 G/DL (ref 12–16)
IMM GRANULOCYTES # BLD AUTO: 0.01 X10*3/UL (ref 0–0.7)
IMM GRANULOCYTES NFR BLD AUTO: 0.2 % (ref 0–0.9)
IRON SATN MFR SERPL: 24 % (ref 25–45)
IRON SERPL-MCNC: 63 UG/DL (ref 35–150)
LYMPHOCYTES # BLD AUTO: 1.59 X10*3/UL (ref 1.2–4.8)
LYMPHOCYTES NFR BLD AUTO: 30.2 %
MCH RBC QN AUTO: 29 PG (ref 26–34)
MCHC RBC AUTO-ENTMCNC: 33.4 G/DL (ref 32–36)
MCV RBC AUTO: 87 FL (ref 80–100)
MONOCYTES # BLD AUTO: 0.54 X10*3/UL (ref 0.1–1)
MONOCYTES NFR BLD AUTO: 10.2 %
NEUTROPHILS # BLD AUTO: 2.83 X10*3/UL (ref 1.2–7.7)
NEUTROPHILS NFR BLD AUTO: 53.7 %
NRBC BLD-RTO: 0 /100 WBCS (ref 0–0)
PLATELET # BLD AUTO: 402 X10*3/UL (ref 150–450)
RBC # BLD AUTO: 4.82 X10*6/UL (ref 4–5.2)
T3FREE SERPL-MCNC: 4 PG/ML (ref 2.3–4.2)
T4 FREE SERPL-MCNC: 1.73 NG/DL (ref 0.78–1.48)
TIBC SERPL-MCNC: 261 UG/DL (ref 240–445)
TSH SERPL-ACNC: 0.11 MIU/L (ref 0.44–3.98)
UIBC SERPL-MCNC: 198 UG/DL (ref 110–370)
WBC # BLD AUTO: 5.3 X10*3/UL (ref 4.4–11.3)

## 2025-01-17 PROCEDURE — 84439 ASSAY OF FREE THYROXINE: CPT

## 2025-01-17 PROCEDURE — 84481 FREE ASSAY (FT-3): CPT

## 2025-01-17 PROCEDURE — 84443 ASSAY THYROID STIM HORMONE: CPT

## 2025-01-17 PROCEDURE — 83550 IRON BINDING TEST: CPT

## 2025-01-17 PROCEDURE — 83540 ASSAY OF IRON: CPT

## 2025-01-17 PROCEDURE — 82728 ASSAY OF FERRITIN: CPT

## 2025-01-17 PROCEDURE — 85025 COMPLETE CBC W/AUTO DIFF WBC: CPT

## 2025-01-19 ENCOUNTER — PATIENT MESSAGE (OUTPATIENT)
Dept: PRIMARY CARE | Facility: CLINIC | Age: 30
End: 2025-01-19
Payer: COMMERCIAL

## 2025-01-19 DIAGNOSIS — B00.1 HERPES LABIALIS: ICD-10-CM

## 2025-01-20 ENCOUNTER — PATIENT MESSAGE (OUTPATIENT)
Dept: PRIMARY CARE | Facility: CLINIC | Age: 30
End: 2025-01-20
Payer: COMMERCIAL

## 2025-01-21 PROCEDURE — RXMED WILLOW AMBULATORY MEDICATION CHARGE

## 2025-01-21 RX ORDER — VALACYCLOVIR HYDROCHLORIDE 1 G/1
2000 TABLET, FILM COATED ORAL 2 TIMES DAILY
Qty: 20 TABLET | Refills: 0 | Status: SHIPPED | OUTPATIENT
Start: 2025-01-21

## 2025-01-24 ENCOUNTER — PHARMACY VISIT (OUTPATIENT)
Dept: PHARMACY | Facility: CLINIC | Age: 30
End: 2025-01-24
Payer: COMMERCIAL

## 2025-01-24 DIAGNOSIS — B00.1 HERPES LABIALIS: ICD-10-CM

## 2025-01-25 ENCOUNTER — OFFICE VISIT (OUTPATIENT)
Dept: URGENT CARE | Age: 30
End: 2025-01-25
Payer: COMMERCIAL

## 2025-01-25 ENCOUNTER — ANCILLARY PROCEDURE (OUTPATIENT)
Dept: URGENT CARE | Age: 30
End: 2025-01-25
Payer: COMMERCIAL

## 2025-01-25 VITALS
DIASTOLIC BLOOD PRESSURE: 83 MMHG | SYSTOLIC BLOOD PRESSURE: 120 MMHG | TEMPERATURE: 99.3 F | HEART RATE: 98 BPM | OXYGEN SATURATION: 96 % | RESPIRATION RATE: 16 BRPM

## 2025-01-25 DIAGNOSIS — J98.8 RESPIRATORY TRACT INFECTION: ICD-10-CM

## 2025-01-25 DIAGNOSIS — J98.8 RESPIRATORY TRACT INFECTION: Primary | ICD-10-CM

## 2025-01-25 RX ORDER — AMOXICILLIN AND CLAVULANATE POTASSIUM 875; 125 MG/1; MG/1
1 TABLET, FILM COATED ORAL 2 TIMES DAILY
Qty: 20 TABLET | Refills: 0 | Status: SHIPPED | OUTPATIENT
Start: 2025-01-25 | End: 2025-02-04

## 2025-01-25 NOTE — PATIENT INSTRUCTIONS
You were seen for cold like symptoms.  You most likely have a upper respiratory tract infection.  I recommend supportive therapy with the following medications below.    PLAN:  1.  Please take antibiotic as prescribed    2.  You can use Tea and honey for cough. This is known to work better than most OTC medications.    3.  Mucinex can break up congestion in adults.        Aller-Noe or over-the-counter children's cold medication such as Dimetapp can be beneficial for symptoms in kids.    4.  Stay well-hydrated and drink lots of fluids.    5.  Follow up with your primary care physician in the next few days for reevaluation, especially if symptoms are not improving.    6.  Return to the urgent care or emergency department if symptoms worsen or new symptoms develop.    Based on clinical exam findings and history you most likely have a upper respiratory tract infection.  Your initial illness was likely caused by a virus that progressed to a secondary bacterial infection.  You are contagious as soon as the symptoms start.  Your symptoms may persist up to 2-3 weeks.  Symptoms usually peak by days 3 or 5.  Typical symptoms include nasal congestion, a runny nose, scratchy throat, cough, and irritability. The diagnosis is based on symptoms. Good hand hygiene is the best way to prevent these infections, and routine vaccination can help prevent influenza. Treatment aims to relieve symptoms. Rest and fluids. Tylenol and/or ibuprofen for fever and pain. Over the counter decongestants or mucolytics.  All do not take medications that may be contraindicated by another medical condition.

## 2025-01-25 NOTE — PROGRESS NOTES
Subjective   Patient ID: Nargis Strong is a 29 y.o. female. They present today with a chief complaint of Bronchitis (Since the 16th).    CC: URI symptoms x 10 to 12 days    HPI: Patient presenting for URI symptoms.  Symptoms include runny nose, congestion, and a semiproductive cough.  No fever, chills, myalgias, abdominal pain, nausea, vomiting, diarrhea, rash.  No chest pain or shortness of breath.  No history of clots or clotting disorders.  No lower extremity swelling edema or pain.  No recent travel or surgeries.  Reports that her physician is treating her for bronchitis.  Has been taking albuterol and prednisone without much relief of her symptoms.    Past Medical History  Allergies as of 01/25/2025 - Reviewed 01/25/2025   Allergen Reaction Noted    Contrave [naltrexone-bupropion] Hives, Fever, and Nausea/vomiting 07/24/2024    Seasonale (91) [levonorgestrel-ethinyl estrad] Runny nose 05/31/2023       (Not in a hospital admission)      Past Medical History:   Diagnosis Date    ADHD (attention deficit hyperactivity disorder)     Allergic     Anemia     Anxiety     Asthma     Disease of thyroid gland     Headache        History reviewed. No pertinent surgical history.     reports that she has never smoked. She has never used smokeless tobacco. She reports that she does not drink alcohol and does not use drugs.    Review of Systems  Review of Systems    After reviewing all body systems I have documented pertinent findings above in the history.  All other Systems reviewed and are negative for complaint.  Pertinent positive and negatives are listed in the above HPI.    Objective    Vitals:    01/25/25 1125 01/25/25 1203   BP: 120/83    Pulse: 108 98   Resp: 16    Temp: 37.4 °C (99.3 °F)    SpO2: 96%      No LMP recorded.  Physical Exam    General: Alert, oriented, and cooperative.  No acute distress. Well developed, well nourished.     Skin: Skin is warm, and dry. No rashes or lesions.    Eyes: Sclera and conjunctivae  normal     Ears: TM´s are intact, pink/grey, with mild effusions bilaterally.  No significant erythema.  No hemotympanum or rupture. Bilateral auricle, helix, and tragus without inflammation or erythema.  No mastoid erythema, or tenderness.  No deformities. Right and left canal negative for erythema, or discharge.  Hearing is grossly intact bilaterally    Mouth/Throat: Pharynx is erythematous.  Tonsils are equal in size.  No exudates.  No angioedema of the lips or tongue.  No respiratory compromise, tripoding, drooling, muffled voice,  stridor, or trismus.     Neck: Supple.     Cardiac: Regular rate     Respiratory:  No acute respiratory distress.  Regular rate of breathing.  No accessory muscle use.  No tripoding.  Lungs are clear bilaterally.    Abdomen: Soft, nontender.    Musculoskeletal: Upper and lower extremities are atraumatic in appearance without deformity, erythema, edema, and atrophy. No crepitus, or tenderness. Compartments are all soft.    Procedures    Point of Care Test & Imaging Results from this visit    XR chest 2 views    Result Date: 1/25/2025  Interpreted By:  Tristan Castellanos, STUDY: XR CHEST 2 VIEWS  1/25/2025 11:43 am   INDICATION: Signs/Symptoms:cough   COMPARISON: None.   ACCESSION NUMBER(S): [id number]   ORDERING CLINICIAN: ISABELA RAMIREZ   TECHNIQUE: PA and lateral views of the chest were obtained.   FINDINGS: No focal infiltrate, pleural effusion or pneumothorax is identified. The cardiac silhouette is within normal limits for size.       No focal infiltrate or pneumothorax.   MACRO: None.   Signed by: Tristan Castellanos 1/25/2025 11:46 AM Dictation workstation:   [id number]     Diagnostic study results (if any) were reviewed by Isabela Ramirez PA-C.    Assessment/Plan   Allergies, medications, history, and pertinent labs/EKGs/Imaging reviewed by Isabela Ramirez PA-C.     MDM:  Patient presenting for URI type symptoms X 10 days.  No relief despite taking over-the-counter medications.   Patient overall looks well.  Speaks in complete sentences.  No evidence of acute distress or respiratory compromise.  No tripoding. No nasal flaring. No clinical signs or history to suggest meningitis, Freeman´s, peritonsillar abscess, epiglottitis, or asthma.  Due length and worsening of  symptoms a bacterial respiratory tract infection is considered the most likely cause.  Through shared decision making the patient was prescribed an antimicrobial as a treatment measure.  Advised supportive therapy with over the counter medication and follow-up with a PCP in the next few days. Pt/family instructed to return to the urgent care or emergency department if symptoms worsen or if new symptoms develop. Patient/family expressed understanding and consented to the above plan. No barriers of communication were apparent.      Orders and Diagnoses  Diagnoses and all orders for this visit:  Respiratory tract infection  -     XR chest 2 views; Future  -     amoxicillin-pot clavulanate (Augmentin) 875-125 mg tablet; Take 1 tablet by mouth 2 times a day for 10 days.      Medical Admin Record      Patient disposition: Home    Electronically signed by Kodak Ramirez PA-C  12:04 PM

## 2025-01-27 RX ORDER — VALACYCLOVIR HYDROCHLORIDE 1 G/1
2000 TABLET, FILM COATED ORAL 2 TIMES DAILY
Qty: 28 TABLET | Refills: 0 | Status: SHIPPED | OUTPATIENT
Start: 2025-01-27 | End: 2025-02-03

## 2025-02-03 ENCOUNTER — APPOINTMENT (OUTPATIENT)
Dept: PODIATRY | Facility: CLINIC | Age: 30
End: 2025-02-03
Payer: COMMERCIAL

## 2025-02-04 PROCEDURE — RXMED WILLOW AMBULATORY MEDICATION CHARGE

## 2025-02-07 ENCOUNTER — PHARMACY VISIT (OUTPATIENT)
Dept: PHARMACY | Facility: CLINIC | Age: 30
End: 2025-02-07
Payer: COMMERCIAL

## 2025-02-19 ENCOUNTER — PATIENT MESSAGE (OUTPATIENT)
Dept: PRIMARY CARE | Facility: CLINIC | Age: 30
End: 2025-02-19
Payer: COMMERCIAL

## 2025-02-19 DIAGNOSIS — E06.3 HASHIMOTO'S DISEASE: Primary | ICD-10-CM

## 2025-02-20 ENCOUNTER — TELEMEDICINE (OUTPATIENT)
Dept: PRIMARY CARE | Facility: CLINIC | Age: 30
End: 2025-02-20
Payer: COMMERCIAL

## 2025-02-20 DIAGNOSIS — R53.83 OTHER FATIGUE: Primary | ICD-10-CM

## 2025-02-20 LAB — TSH SERPL-ACNC: 0.71 MIU/L

## 2025-02-20 PROCEDURE — 99213 OFFICE O/P EST LOW 20 MIN: CPT | Performed by: FAMILY MEDICINE

## 2025-02-20 RX ORDER — BUTYROSPERMUM PARKII(SHEA BUTTER), SIMMONDSIA CHINENSIS (JOJOBA) SEED OIL, ALOE BARBADENSIS LEAF EXTRACT .01; 1; 3.5 G/100G; G/100G; G/100G
1 LIQUID TOPICAL DAILY
COMMUNITY

## 2025-02-20 ASSESSMENT — ENCOUNTER SYMPTOMS
FATIGUE: 1
FEVER: 0
HEADACHES: 0
SHORTNESS OF BREATH: 0
ACTIVITY CHANGE: 0
DIZZINESS: 0

## 2025-02-20 NOTE — PROGRESS NOTES
Subjective   Patient ID: Nargis Strong is a 29 y.o. female who presents for Follow-up (Follow tests still having fatigue).    Virtual or Telephone Consent    An interactive audio and video telecommunication system which permits real time communications between the patient (at the originating site) and provider (at the distant site) was utilized to provide this telehealth service.   Verbal consent was requested and obtained from Nargis Strong on this date, 02/20/25 for a telehealth visit.       Fatigue   - reports she was having issues with fatigue, muscle pain joint pain and headaches   - has not been getting improvement over the last few weeks   - now having significant migraine issues, has been taking nurtec to help manage symptoms   - does not have a history of migraine headaches, and has not had one before   - has had negative testing for both flu and covid   - endorses significant weakness, muscle are tired and fatigue easily   - reports she feels like she could sleep for 18+ hours and still feel tired   - has no change to the diet, no change to the exercise   - denies any significant muscle tenderness   - has started taking magnesium without significant improvement in symptoms          Review of Systems   Constitutional:  Positive for fatigue. Negative for activity change and fever.   Respiratory:  Negative for shortness of breath.    Cardiovascular:  Negative for chest pain.   Neurological:  Negative for dizziness and headaches.       Objective   There were no vitals taken for this visit.    Physical Exam  Constitutional:       Appearance: Normal appearance.   Neurological:      Mental Status: She is alert.   Psychiatric:         Mood and Affect: Mood normal.         Behavior: Behavior normal.       Assessment/Plan   Problem List Items Addressed This Visit    None          Relevant Orders    Sedimentation Rate    C-reactive protein    Rheumatoid factor    LAURA with Reflex to LEOLA    CK    Comprehensive metabolic panel    Vitamin D 25-Hydroxy,Total (for eval of Vitamin D levels)

## 2025-02-28 LAB
25(OH)D3+25(OH)D2 SERPL-MCNC: 23 NG/ML (ref 30–100)
ALBUMIN SERPL-MCNC: 4.1 G/DL (ref 3.6–5.1)
ALP SERPL-CCNC: 66 U/L (ref 31–125)
ALT SERPL-CCNC: 10 U/L (ref 6–29)
ANA PAT SER IF-IMP: ABNORMAL
ANA SER QL IF: POSITIVE
ANA TITR SER IF: ABNORMAL TITER
ANION GAP SERPL CALCULATED.4IONS-SCNC: 5 MMOL/L (CALC) (ref 7–17)
AST SERPL-CCNC: 12 U/L (ref 10–30)
BILIRUB SERPL-MCNC: 0.2 MG/DL (ref 0.2–1.2)
BUN SERPL-MCNC: 9 MG/DL (ref 7–25)
CALCIUM SERPL-MCNC: 9 MG/DL (ref 8.6–10.2)
CHLORIDE SERPL-SCNC: 103 MMOL/L (ref 98–110)
CK SERPL-CCNC: 67 U/L (ref 20–239)
CO2 SERPL-SCNC: 30 MMOL/L (ref 20–32)
CREAT SERPL-MCNC: 0.66 MG/DL (ref 0.5–0.96)
CRP SERPL-MCNC: 5.7 MG/L
DSDNA AB SER-ACNC: ABNORMAL [IU]/ML
EGFRCR SERPLBLD CKD-EPI 2021: 123 ML/MIN/1.73M2
ENA RNP AB SER-ACNC: ABNORMAL
ENA SM AB SER IA-ACNC: ABNORMAL
ENA SM+RNP AB SER IA-ACNC: ABNORMAL
ERYTHROCYTE [SEDIMENTATION RATE] IN BLOOD BY WESTERGREN METHOD: 45 MM/H
GLUCOSE SERPL-MCNC: 84 MG/DL (ref 65–99)
NUCLEOSOME AB SER IA-ACNC: ABNORMAL
POTASSIUM SERPL-SCNC: 4.2 MMOL/L (ref 3.5–5.3)
PROT SERPL-MCNC: 7.1 G/DL (ref 6.1–8.1)
RHEUMATOID FACT SERPL-ACNC: <10 IU/ML
SODIUM SERPL-SCNC: 138 MMOL/L (ref 135–146)

## 2025-03-03 ENCOUNTER — PATIENT MESSAGE (OUTPATIENT)
Dept: PRIMARY CARE | Facility: CLINIC | Age: 30
End: 2025-03-03
Payer: COMMERCIAL

## 2025-03-03 DIAGNOSIS — R53.83 OTHER FATIGUE: Primary | ICD-10-CM

## 2025-03-03 LAB
25(OH)D3+25(OH)D2 SERPL-MCNC: 23 NG/ML (ref 30–100)
ALBUMIN SERPL-MCNC: 4.1 G/DL (ref 3.6–5.1)
ALP SERPL-CCNC: 66 U/L (ref 31–125)
ALT SERPL-CCNC: 10 U/L (ref 6–29)
ANA PAT SER IF-IMP: ABNORMAL
ANA SER QL IF: POSITIVE
ANA TITR SER IF: ABNORMAL TITER
ANION GAP SERPL CALCULATED.4IONS-SCNC: 5 MMOL/L (CALC) (ref 7–17)
AST SERPL-CCNC: 12 U/L (ref 10–30)
BILIRUB SERPL-MCNC: 0.2 MG/DL (ref 0.2–1.2)
BUN SERPL-MCNC: 9 MG/DL (ref 7–25)
CALCIUM SERPL-MCNC: 9 MG/DL (ref 8.6–10.2)
CENTROMERE B AB SER-ACNC: ABNORMAL AI
CHLORIDE SERPL-SCNC: 103 MMOL/L (ref 98–110)
CK SERPL-CCNC: 67 U/L (ref 20–239)
CO2 SERPL-SCNC: 30 MMOL/L (ref 20–32)
CREAT SERPL-MCNC: 0.66 MG/DL (ref 0.5–0.96)
CRP SERPL-MCNC: 5.7 MG/L
DSDNA AB SER-ACNC: <1 IU/ML
EGFRCR SERPLBLD CKD-EPI 2021: 123 ML/MIN/1.73M2
ENA JO1 AB SER IA-ACNC: ABNORMAL AI
ENA RNP AB SER-ACNC: ABNORMAL AI
ENA SCL70 AB SER IA-ACNC: ABNORMAL AI
ENA SM AB SER IA-ACNC: ABNORMAL AI
ENA SM+RNP AB SER IA-ACNC: ABNORMAL AI
ENA SS-A AB SER IA-ACNC: ABNORMAL AI
ENA SS-B AB SER IA-ACNC: ABNORMAL AI
ERYTHROCYTE [SEDIMENTATION RATE] IN BLOOD BY WESTERGREN METHOD: 45 MM/H
GLUCOSE SERPL-MCNC: 84 MG/DL (ref 65–99)
LABORATORY COMMENT REPORT: ABNORMAL
NUCLEOSOME AB SER IA-ACNC: ABNORMAL AI
POTASSIUM SERPL-SCNC: 4.2 MMOL/L (ref 3.5–5.3)
PROT SERPL-MCNC: 7.1 G/DL (ref 6.1–8.1)
RHEUMATOID FACT SERPL-ACNC: <10 IU/ML
RIBOSOMAL P AB SER-ACNC: ABNORMAL AI
SODIUM SERPL-SCNC: 138 MMOL/L (ref 135–146)

## 2025-03-03 RX ORDER — ERGOCALCIFEROL 1.25 MG/1
50000 CAPSULE ORAL WEEKLY
Qty: 4 CAPSULE | Refills: 1 | Status: SHIPPED | OUTPATIENT
Start: 2025-03-03 | End: 2025-04-28

## 2025-03-04 ENCOUNTER — TELEPHONE (OUTPATIENT)
Dept: PRIMARY CARE | Facility: CLINIC | Age: 30
End: 2025-03-04
Payer: COMMERCIAL

## 2025-03-04 DIAGNOSIS — E55.9 VITAMIN D DEFICIENCY: Primary | ICD-10-CM

## 2025-03-04 RX ORDER — ASPIRIN 325 MG
50000 TABLET, DELAYED RELEASE (ENTERIC COATED) ORAL
Qty: 4 CAPSULE | Refills: 2 | Status: SHIPPED | OUTPATIENT
Start: 2025-03-04 | End: 2025-06-02

## 2025-03-06 PROCEDURE — RXMED WILLOW AMBULATORY MEDICATION CHARGE

## 2025-03-07 ENCOUNTER — PHARMACY VISIT (OUTPATIENT)
Dept: PHARMACY | Facility: CLINIC | Age: 30
End: 2025-03-07
Payer: COMMERCIAL

## 2025-04-02 PROCEDURE — RXMED WILLOW AMBULATORY MEDICATION CHARGE

## 2025-04-05 ENCOUNTER — PHARMACY VISIT (OUTPATIENT)
Dept: PHARMACY | Facility: CLINIC | Age: 30
End: 2025-04-05
Payer: COMMERCIAL

## 2025-04-09 ENCOUNTER — APPOINTMENT (OUTPATIENT)
Facility: CLINIC | Age: 30
End: 2025-04-09
Payer: COMMERCIAL

## 2025-04-16 ENCOUNTER — OFFICE VISIT (OUTPATIENT)
Dept: DERMATOLOGY | Facility: CLINIC | Age: 30
End: 2025-04-16
Payer: COMMERCIAL

## 2025-04-16 DIAGNOSIS — L70.0 SUPERFICIAL MIXED COMEDONAL AND INFLAMMATORY ACNE VULGARIS: ICD-10-CM

## 2025-04-16 DIAGNOSIS — D22.9 BENIGN NEVUS: Primary | ICD-10-CM

## 2025-04-16 PROCEDURE — 1036F TOBACCO NON-USER: CPT | Performed by: NURSE PRACTITIONER

## 2025-04-16 PROCEDURE — 99203 OFFICE O/P NEW LOW 30 MIN: CPT | Performed by: NURSE PRACTITIONER

## 2025-04-16 RX ORDER — SULFACETAMIDE SODIUM, SULFUR 100; 50 MG/G; MG/G
EMULSION TOPICAL
Qty: 340 G | Refills: 3 | Status: SHIPPED | OUTPATIENT
Start: 2025-04-16

## 2025-04-16 NOTE — Clinical Note
This is a 29 y.o. female here for acne. Has been taking tretinoin 0.1% cream for over 1 year. While it has improved, she has stalled at this time. Allergy to topical clindamycin. Has not tried SS wash. Will switch tretinoin to aklief daily at bedtime and stop use of BPO and SA (current regimen) and use SS wash twice daily rinse after 3-5 minutes.     PLAN    Follow guidelines reviewed in patient instruction section    AM - Wash face with SS wash rinse after 3-5 minutes   PM - Wash face with SS wash rinse after 3-5 minutes  then apply Aklief cream as directed (follow acne handout)

## 2025-04-16 NOTE — PROGRESS NOTES
Subjective     Nargis Strong is a 29 y.o. female who presents for the following: Acne (face) and Suspicious Skin Lesion (back).     Review of Systems:  No other skin or systemic complaints other than what is documented elsewhere in the note.    The following portions of the chart were reviewed this encounter and updated as appropriate:   Tobacco  Allergies  Meds  Problems  Med Hx  Surg Hx         Skin Cancer History  Biopsy Log Book  No skin cancers from Specimen Tracking.    Additional History      Specialty Problems          Dermatology Problems    Acne    Comment on above: ACNE         Bruising        Objective   Well appearing patient in no apparent distress; mood and affect are within normal limits.    A focused skin examination was performed neck up and back.  All findings within normal limits unless otherwise noted below.    Assessment/Plan   Skin Exam  1. BENIGN NEVUS  Mid Back  5.5 x 3 mm uniform oval dark brown thin papule with some asymmetrical dark brown globules.   The present appearance of the lesion is not worrisome but it should continue to be observed and testing/treatment may be warranted if change occurs.    Patient has already taken a photo of the mid back nevus. Advised to monitor for now and if noticing change in shape, size or color, she will notify me.   Related Procedures  Follow Up In Dermatology - Established Patient  2. SUPERFICIAL MIXED COMEDONAL AND INFLAMMATORY ACNE VULGARIS  Generalized  Mixture of open and closed comedomes with scattered papulopustules to the cheeks, forehead and chin.   This is a 29 y.o. female here for acne. Has been taking tretinoin 0.1% cream for over 1 year. While it has improved, she has stalled at this time. Allergy to topical clindamycin. Has not tried SS wash. Will switch tretinoin to aklief daily at bedtime and stop use of BPO and SA (current regimen) and use SS wash twice daily rinse after 3-5 minutes.     PLAN    Follow guidelines reviewed in patient  instruction section    AM - Wash face with SS wash rinse after 3-5 minutes   PM - Wash face with SS wash rinse after 3-5 minutes  then apply Aklief cream as directed (follow acne handout)   Related Procedures  Follow Up In Dermatology - Established Patient  Related Medications  sulfacetamide sodium-sulfur (Avar, Plexion) 10-5 % (w/w) topical emulsion  Wash affected areas twice daily rinse after 5 minutes  trifarotene 0.005 % cream  Apply a thin layer to affected areas once daily at bedtime as tolerated.    Return in 3 months for acne or return to clinic sooner if needed

## 2025-04-16 NOTE — Clinical Note
Mixture of open and closed comedomes with scattered papulopustules to the cheeks, forehead and chin.

## 2025-04-16 NOTE — Clinical Note
The present appearance of the lesion is not worrisome but it should continue to be observed and testing/treatment may be warranted if change occurs.    Patient has already taken a photo of the mid back nevus. Advised to monitor for now and if noticing change in shape, size or color, she will notify me.

## 2025-04-21 DIAGNOSIS — L70.0 SUPERFICIAL MIXED COMEDONAL AND INFLAMMATORY ACNE VULGARIS: Primary | ICD-10-CM

## 2025-04-21 RX ORDER — TAZAROTENE 1 MG/G
CREAM TOPICAL
Qty: 30 G | Refills: 2 | Status: SHIPPED | OUTPATIENT
Start: 2025-04-21

## 2025-04-24 PROCEDURE — RXMED WILLOW AMBULATORY MEDICATION CHARGE

## 2025-04-25 ENCOUNTER — APPOINTMENT (OUTPATIENT)
Facility: CLINIC | Age: 30
End: 2025-04-25
Payer: COMMERCIAL

## 2025-04-25 ENCOUNTER — PHARMACY VISIT (OUTPATIENT)
Dept: PHARMACY | Facility: CLINIC | Age: 30
End: 2025-04-25
Payer: COMMERCIAL

## 2025-04-25 VITALS
BODY MASS INDEX: 32.02 KG/M2 | SYSTOLIC BLOOD PRESSURE: 116 MMHG | DIASTOLIC BLOOD PRESSURE: 81 MMHG | WEIGHT: 174 LBS | HEART RATE: 93 BPM | HEIGHT: 62 IN | TEMPERATURE: 97.9 F

## 2025-04-25 DIAGNOSIS — G93.32 CHRONIC FATIGUE SYNDROME: ICD-10-CM

## 2025-04-25 DIAGNOSIS — R53.83 OTHER FATIGUE: ICD-10-CM

## 2025-04-25 DIAGNOSIS — G43.709 CHRONIC MIGRAINE WITHOUT AURA WITHOUT STATUS MIGRAINOSUS, NOT INTRACTABLE: Primary | ICD-10-CM

## 2025-04-25 DIAGNOSIS — R76.8 ANA POSITIVE: ICD-10-CM

## 2025-04-25 PROCEDURE — 99204 OFFICE O/P NEW MOD 45 MIN: CPT | Performed by: INTERNAL MEDICINE

## 2025-04-25 PROCEDURE — 3008F BODY MASS INDEX DOCD: CPT | Performed by: INTERNAL MEDICINE

## 2025-04-25 PROCEDURE — 1036F TOBACCO NON-USER: CPT | Performed by: INTERNAL MEDICINE
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
## 2025-04-25 NOTE — PATIENT INSTRUCTIONS
I am referring you to neurology for the headaches    Please Look into holistic medicine for fatigue syndrome

## 2025-05-05 PROCEDURE — RXMED WILLOW AMBULATORY MEDICATION CHARGE

## 2025-05-07 ENCOUNTER — PATIENT MESSAGE (OUTPATIENT)
Dept: PRIMARY CARE | Facility: CLINIC | Age: 30
End: 2025-05-07
Payer: COMMERCIAL

## 2025-05-08 ENCOUNTER — PHARMACY VISIT (OUTPATIENT)
Dept: PHARMACY | Facility: CLINIC | Age: 30
End: 2025-05-08
Payer: COMMERCIAL

## 2025-05-20 ENCOUNTER — APPOINTMENT (OUTPATIENT)
Dept: PRIMARY CARE | Facility: CLINIC | Age: 30
End: 2025-05-20
Payer: COMMERCIAL

## 2025-05-20 DIAGNOSIS — F41.9 ANXIETY AND DEPRESSION: Primary | ICD-10-CM

## 2025-05-20 DIAGNOSIS — F32.A ANXIETY AND DEPRESSION: Primary | ICD-10-CM

## 2025-05-20 PROBLEM — R73.03 BORDERLINE DIABETIC: Status: ACTIVE | Noted: 2025-05-20

## 2025-05-20 PROBLEM — E04.0 DIFFUSE GOITER: Status: ACTIVE | Noted: 2025-05-20

## 2025-05-20 PROBLEM — E06.3 AUTOIMMUNE HYPOTHYROIDISM: Status: ACTIVE | Noted: 2025-05-20

## 2025-05-20 RX ORDER — SERTRALINE HYDROCHLORIDE 25 MG/1
25 TABLET, FILM COATED ORAL DAILY
Qty: 90 TABLET | Refills: 0 | Status: SHIPPED | OUTPATIENT
Start: 2025-05-20 | End: 2025-08-18

## 2025-05-20 NOTE — PROGRESS NOTES
Subjective   Patient ID: Nargis Strong is a 29 y.o. female who presents for Follow-up.    HPI Pt is a pleasant 29 y.o. F who was seen and evaluated during the VV. The patient reports experiencing significant anxiety and an increase in depressive symptoms, which she attributes to ongoing life stressors. Pt denies suicidal or homicidal ideation. The patient expresses interest in initiating pharmacologic treatment to help manage her symptoms. During the clinical encounter, the patient denies fever, chills, shortness of breath, chest pain or tightness, abdominal pain, nausea, vomiting, or diarrhea. She also reports no changes in urination and denies any additional health concerns at this time. Pt denies chance of pregnancy as well.  Social History: Reviewed  Medication List and Allergies: Reviewed  Past Medical History and Family History: Reviewed    Review of Systems  The systems have been reviewed as follows:   Constitutional: no fever, no chills  Eyes: no eyesight problems, no eye redness, no eye pain, no blurred vision  ENT: no ear pain or sore throat, no nasal discharge or congestion, no sinus pressure, no hearing loss  Cardiovascular: no chest pain or tightness, no SOB, the heart rate was not fast or slow  Respiratory: no cough, no dyspnea with exertion or with rest, no wheezing  Gastrointestinal: no abdominal pain, no constipation or diarrhea, no heartburn, no nausea or vomiting  Genitourinary: no urine frequency, no dysuria, no urinary urgency, no urinary incontinence or retention  Musculoskeletal: no back pain, no arthralgia, no joint swelling or stiffness, no muscle weakness  Integumentary: no skin lesions or rash  Neurological: no headaches, no dizziness or fainting, no limb weakness  Psychiatric: no SI/HI, but as mentioned at HPI  Endocrine: no weight change, no temperature intolerance, no   change of appetite  Hematologic/Lymphatic: no easy bruising or bleeding    Objective   There were no vitals taken for  this visit.    Physical Exam  PE was limited due to the virtual settings of the visit  Pt appears comfortable, not in acute distress  Pt is Ax0x3, pleasant, follows the commands  Skin color is normal, clear conjunctiva  Breathing unlabored  No neck lymphadenopathy noticed     Assessment/Plan   Anxiety and MDD:  HX as mentioned GAD7 score is 14 points. PHQ9 score is 7  Will start on Sertraline 25 mg PO daily with close FU OV  Pt was instructed to contact PCP if pt will have no improvement of the condition/worsening of the sxs/new sxs on the current treatment  I discussed every sxs with the pt in detail. Pt verbalized understanding of the health  condition and agreed with the clinical approach as discussed as mentioned above  Please FU with PCP as planned or sooner if needed.  Virtual or Telephone Consent    An interactive audio and video telecommunication system which permits real time communications between the patient (at the originating site) and provider (at the distant site) was utilized to provide this telehealth service.   Verbal consent was requested and obtained from Nargis Strong on this date, 05/20/25 for a telehealth visit and the patient's location was confirmed at the time of the visit.    Pt confirm identity and physical location in [facility]

## 2025-05-21 ENCOUNTER — APPOINTMENT (OUTPATIENT)
Dept: GASTROENTEROLOGY | Facility: CLINIC | Age: 30
End: 2025-05-21
Payer: COMMERCIAL

## 2025-05-30 LAB
NON-UH HIE FREE T4: 1.62 NG/DL (ref 0.89–1.76)
NON-UH HIE FREE T4: 1.62 NG/DL (ref 0.89–1.76)
NON-UH HIE HGB A1C: 5.1 %
NON-UH HIE TSH: 0.38 UIU/ML (ref 0.55–4.78)
NON-UH HIE TSH: 0.38 UIU/ML (ref 0.55–4.78)
NON-UH HIE VIT D 25: 65 NG/ML

## 2025-06-10 ENCOUNTER — PHARMACY VISIT (OUTPATIENT)
Dept: PHARMACY | Facility: CLINIC | Age: 30
End: 2025-06-10
Payer: COMMERCIAL

## 2025-06-10 ENCOUNTER — APPOINTMENT (OUTPATIENT)
Dept: PRIMARY CARE | Facility: CLINIC | Age: 30
End: 2025-06-10
Payer: COMMERCIAL

## 2025-06-10 VITALS
HEIGHT: 62 IN | TEMPERATURE: 97.5 F | WEIGHT: 185.6 LBS | HEART RATE: 75 BPM | BODY MASS INDEX: 34.16 KG/M2 | OXYGEN SATURATION: 97 % | SYSTOLIC BLOOD PRESSURE: 92 MMHG | DIASTOLIC BLOOD PRESSURE: 61 MMHG

## 2025-06-10 DIAGNOSIS — Z02.83 ENCOUNTER FOR DRUG SCREENING: ICD-10-CM

## 2025-06-10 DIAGNOSIS — Z78.9 NON-SMOKER: ICD-10-CM

## 2025-06-10 DIAGNOSIS — Z00.00 VISIT FOR ANNUAL HEALTH EXAMINATION: Primary | ICD-10-CM

## 2025-06-10 DIAGNOSIS — Z78.9 PATIENT HAD NO FALLS IN PAST YEAR: ICD-10-CM

## 2025-06-10 DIAGNOSIS — Z79.899 CONTROLLED SUBSTANCE AGREEMENT SIGNED: ICD-10-CM

## 2025-06-10 DIAGNOSIS — Z13.89 SCREENING FOR OBESITY: ICD-10-CM

## 2025-06-10 DIAGNOSIS — Z53.9: ICD-10-CM

## 2025-06-10 DIAGNOSIS — E66.811 CLASS 1 OBESITY DUE TO EXCESS CALORIES WITH BODY MASS INDEX (BMI) OF 34.0 TO 34.9 IN ADULT, UNSPECIFIED WHETHER SERIOUS COMORBIDITY PRESENT: ICD-10-CM

## 2025-06-10 DIAGNOSIS — E66.09 CLASS 1 OBESITY DUE TO EXCESS CALORIES WITH BODY MASS INDEX (BMI) OF 34.0 TO 34.9 IN ADULT, UNSPECIFIED WHETHER SERIOUS COMORBIDITY PRESENT: ICD-10-CM

## 2025-06-10 DIAGNOSIS — Z32.00 ENCOUNTER FOR PREGNANCY TEST, RESULT UNKNOWN: ICD-10-CM

## 2025-06-10 DIAGNOSIS — Z11.59 NEED FOR HEPATITIS C SCREENING TEST: ICD-10-CM

## 2025-06-10 PROBLEM — Z13.1 SCREENING FOR DIABETES MELLITUS: Status: ACTIVE | Noted: 2024-01-10

## 2025-06-10 LAB — PREGNANCY TEST URINE, POC: NEGATIVE

## 2025-06-10 PROCEDURE — 1036F TOBACCO NON-USER: CPT | Performed by: INTERNAL MEDICINE

## 2025-06-10 PROCEDURE — 81025 URINE PREGNANCY TEST: CPT | Performed by: INTERNAL MEDICINE

## 2025-06-10 PROCEDURE — RXMED WILLOW AMBULATORY MEDICATION CHARGE

## 2025-06-10 PROCEDURE — 3008F BODY MASS INDEX DOCD: CPT | Performed by: INTERNAL MEDICINE

## 2025-06-10 PROCEDURE — G8433 SCR FOR DEP NOT CPT DOC RSN: HCPCS | Performed by: INTERNAL MEDICINE

## 2025-06-10 PROCEDURE — 99395 PREV VISIT EST AGE 18-39: CPT | Performed by: INTERNAL MEDICINE

## 2025-06-10 RX ORDER — PHENTERMINE HYDROCHLORIDE 37.5 MG/1
37.5 TABLET ORAL
Qty: 30 TABLET | Refills: 0 | Status: SHIPPED | OUTPATIENT
Start: 2025-06-10 | End: 2025-07-10

## 2025-06-10 RX ORDER — PHENTERMINE HYDROCHLORIDE 37.5 MG/1
37.5 TABLET ORAL
Qty: 30 TABLET | Refills: 0 | Status: CANCELLED | OUTPATIENT
Start: 2025-06-10 | End: 2025-07-10

## 2025-06-10 NOTE — PROGRESS NOTES
Subjective   Patient ID: Nargis Strong is a 29 y.o. female who presents for Annual Exam.    HPI Pt is a pleasant 30 yo F who was seen and evaluated during the AWV. Pt states that she is feeling better after the dose of levothyroxine was adjusted by the endocrinology team. Pt also would be interested in treatment with weight loss mediations such as phentermine since she was unable to loose weight with the dietary changes and PA. Unfortunately, the insurance plan does not cover the treatment with GLP1 grup of the medication. During the clinical encounter pt denies fever, chills, no SOB, no chest pain/tightness, no abdominal pain, no N/V/D reported, no change of urination reported. Pt denies any other health concerns during this visit.  Sohx: non smoker  List of the medications and allergies: reviewed  PMHx and Fhx: reviewed      Review of Systems  The systems have been reviewed as follows:   Constitutional: no fever, no chills  Eyes: no eyesight problems, no eye redness, no eye pain, no blurred vision  ENT: no ear pain or sore throat, no nasal discharge or congestion, no sinus pressure, no hearing loss  Cardiovascular: no chest pain or tightness, no SOB, the heart rate was not fast or slow  Respiratory: no cough, no dyspnea with exertion or with rest, no wheezing  Gastrointestinal: no abdominal pain, no constipation or diarrhea, no heartburn, no nausea or vomiting  Genitourinary: no urine frequency, no dysuria, no urinary urgency, no urinary incontinence or retention  Musculoskeletal: no back pain, no arthralgia, no joint swelling or stiffness, no muscle weakness  Integumentary: no skin lesions or rash  Neurological: no headaches, no dizziness or fainting, no limb weakness  Psychiatric: no mood changes, no anxiety or depression, no SI/HI  Endocrine: no temperature intolerance, no   change of appetite, unable to loose weight  Hematologic/Lymphatic: no easy bruising or bleeding  Objective   BP 92/61 (BP Location: Left  "arm, Patient Position: Sitting)   Pulse 75   Temp 36.4 °C (97.5 °F) (Skin)   Ht 1.562 m (5' 1.5\")   Wt 84.2 kg (185 lb 9.6 oz)   SpO2 97%   BMI 34.50 kg/m²     Physical Exam  Constitutional: well hydrated, well nourished, no acute distress. Vital signs reviewed  Head and face: normocephalic, atraumatic  Eyes: no erythema, edema or visible abnormalities of conjunctiva or lids. Pupils are equal, round and reactive to light. Extraocular movement normal  ENT: external ears without deformities, external ear canals without redness, swelling or tenderness. TMs normal color, normal landmarks, no fluid, non-retracted  Neck: full ROM, no adenopathy, neck was supple, thyroid gland not enlarged, no palpable nodules  Pulmonary: normal respiratory rate and effort. Lungs are clear to auscultation, no rales,no rhonchi or wheezing  Cardiovascular: RRR, normal S1 and S2, no murmur, no gallop, posterior tib. pulse normal 2+ bilaterally, no lower extremity edema  Abdomen: soft, non tender on palpation, not distended, normal BS in all 4 quadrants, no CVA tenderness  Musculoskeletal: no joint swelling, normal movements of all 4 extremities. Gait and station: normal, Digits and nails: normal without clubbing  Skin: normal color, no rash  Neurologic: Cranial nerves: CN II: visual acuity intact. Source: acuity reported by patient. Pupils reactive to light with normal accommodation. Right and left pupil normal CN III, IV and VI: the EO movements were intact. CN VIII: no hearing loss. Deep tendon reflexes: were 2+ and symmetric: R and L patella.  Sensory exam: normal to light touch  Psychiatric: Mood and affect: normal, Alert and Oriented x 3  Lymphatic: no cervical lymphadenopathy    Assessment/Plan   AWV:  Hx and PE as mentioned  MDD scree ing declined  Obesity screening BMI 34.   Will screen for hep C  Pt is UTD with PAP smear  AWV in 1 year    BMI of 34, consistent with obesity class I;  Pt would like to try to use phentermine for 3 " months to help with the weight issue  U pregnancy test was done at the office and was negative  Drug screening test obtained: pending results  The agreement for the controlled substance use was signed by the pt and PCP. Pt was counseled about the possible SE/adverse reaction and even dependency and death. Pt would like to proceed with the Tx. The 30 day prescription ofr phentermine 37.5 mg PO daily x 30 days was issued  Please FU closely in 1 month  I discussed every sxs with the pt in detail. Pt verbalized understanding of the health  condition and agreed with the clinical approach as discussed as mentioned above  Please FU with PCP as planned or sooner if needed.

## 2025-06-11 ENCOUNTER — PATIENT MESSAGE (OUTPATIENT)
Dept: PRIMARY CARE | Facility: CLINIC | Age: 30
End: 2025-06-11
Payer: COMMERCIAL

## 2025-06-11 DIAGNOSIS — Z86.69 HISTORY OF MIGRAINE: Primary | ICD-10-CM

## 2025-06-11 LAB
AMPHETAMINES UR QL: NEGATIVE NG/ML
BARBITURATES UR QL: NEGATIVE NG/ML
BENZODIAZ UR QL: NEGATIVE NG/ML
BZE UR QL: NEGATIVE NG/ML
CREAT UR-MCNC: 149.3 MG/DL
FENTANYL UR QL SCN: NEGATIVE NG/ML
METHADONE UR QL: NEGATIVE NG/ML
OPIATES UR QL: NEGATIVE NG/ML
OXIDANTS UR QL: NEGATIVE MCG/ML
OXYCODONE UR QL: NEGATIVE NG/ML
PCP UR QL: NEGATIVE NG/ML
PH UR: 7.3 [PH] (ref 4.5–9)
QUEST NOTES AND COMMENTS: NORMAL
THC UR QL: NEGATIVE NG/ML

## 2025-06-11 RX ORDER — NAPROXEN 500 MG/1
500 TABLET ORAL ONCE AS NEEDED
Qty: 7 TABLET | Refills: 0 | Status: SHIPPED | OUTPATIENT
Start: 2025-06-11

## 2025-06-13 PROCEDURE — RXMED WILLOW AMBULATORY MEDICATION CHARGE

## 2025-06-18 ENCOUNTER — PHARMACY VISIT (OUTPATIENT)
Dept: PHARMACY | Facility: CLINIC | Age: 30
End: 2025-06-18
Payer: COMMERCIAL

## 2025-06-23 ENCOUNTER — PATIENT MESSAGE (OUTPATIENT)
Dept: PRIMARY CARE | Facility: CLINIC | Age: 30
End: 2025-06-23
Payer: COMMERCIAL

## 2025-06-25 PROCEDURE — RXMED WILLOW AMBULATORY MEDICATION CHARGE

## 2025-06-26 ENCOUNTER — APPOINTMENT (OUTPATIENT)
Dept: OBSTETRICS AND GYNECOLOGY | Facility: CLINIC | Age: 30
End: 2025-06-26
Payer: COMMERCIAL

## 2025-06-28 DIAGNOSIS — Z86.69 HISTORY OF MIGRAINE: ICD-10-CM

## 2025-06-29 PROCEDURE — RXMED WILLOW AMBULATORY MEDICATION CHARGE

## 2025-06-30 ENCOUNTER — PHARMACY VISIT (OUTPATIENT)
Dept: PHARMACY | Facility: CLINIC | Age: 30
End: 2025-06-30
Payer: COMMERCIAL

## 2025-06-30 DIAGNOSIS — F41.8 ANXIETY ABOUT HEALTH: ICD-10-CM

## 2025-06-30 PROCEDURE — RXMED WILLOW AMBULATORY MEDICATION CHARGE

## 2025-06-30 RX ORDER — HYDROXYZINE PAMOATE 50 MG/1
50 CAPSULE ORAL 3 TIMES DAILY PRN
Qty: 30 CAPSULE | Refills: 0 | Status: SHIPPED | OUTPATIENT
Start: 2025-06-30 | End: 2025-07-15

## 2025-07-01 PROCEDURE — RXMED WILLOW AMBULATORY MEDICATION CHARGE

## 2025-07-01 RX ORDER — NAPROXEN 500 MG/1
500 TABLET ORAL ONCE AS NEEDED
Qty: 7 TABLET | Refills: 0 | Status: SHIPPED | OUTPATIENT
Start: 2025-07-01

## 2025-07-02 PROCEDURE — RXMED WILLOW AMBULATORY MEDICATION CHARGE

## 2025-07-05 ENCOUNTER — PHARMACY VISIT (OUTPATIENT)
Dept: PHARMACY | Facility: CLINIC | Age: 30
End: 2025-07-05
Payer: COMMERCIAL

## 2025-07-08 ENCOUNTER — APPOINTMENT (OUTPATIENT)
Dept: PRIMARY CARE | Facility: CLINIC | Age: 30
End: 2025-07-08
Payer: COMMERCIAL

## 2025-07-08 VITALS
BODY MASS INDEX: 32.02 KG/M2 | WEIGHT: 174 LBS | TEMPERATURE: 97.3 F | DIASTOLIC BLOOD PRESSURE: 70 MMHG | HEART RATE: 92 BPM | SYSTOLIC BLOOD PRESSURE: 104 MMHG | HEIGHT: 62 IN

## 2025-07-08 DIAGNOSIS — Z11.59 NEED FOR HEPATITIS C SCREENING TEST: ICD-10-CM

## 2025-07-08 DIAGNOSIS — Z71.3 ENCOUNTER FOR WEIGHT LOSS COUNSELING: ICD-10-CM

## 2025-07-08 DIAGNOSIS — E66.09 CLASS 1 OBESITY DUE TO EXCESS CALORIES WITH BODY MASS INDEX (BMI) OF 34.0 TO 34.9 IN ADULT, UNSPECIFIED WHETHER SERIOUS COMORBIDITY PRESENT: ICD-10-CM

## 2025-07-08 DIAGNOSIS — F41.9 ANXIETY DISORDER WITH PANIC ATTACKS: ICD-10-CM

## 2025-07-08 DIAGNOSIS — E66.811 CLASS 1 OBESITY WITH SERIOUS COMORBIDITY AND BODY MASS INDEX (BMI) OF 32.0 TO 32.9 IN ADULT, UNSPECIFIED OBESITY TYPE: ICD-10-CM

## 2025-07-08 DIAGNOSIS — Z76.0 ENCOUNTER FOR MEDICATION REFILL: ICD-10-CM

## 2025-07-08 DIAGNOSIS — Z79.899 MEDICATION MANAGEMENT: Primary | ICD-10-CM

## 2025-07-08 DIAGNOSIS — E66.811 CLASS 1 OBESITY DUE TO EXCESS CALORIES WITH BODY MASS INDEX (BMI) OF 34.0 TO 34.9 IN ADULT, UNSPECIFIED WHETHER SERIOUS COMORBIDITY PRESENT: ICD-10-CM

## 2025-07-08 DIAGNOSIS — Z11.59 NEED FOR HEPATITIS B SCREENING TEST: ICD-10-CM

## 2025-07-08 PROCEDURE — 3008F BODY MASS INDEX DOCD: CPT | Performed by: INTERNAL MEDICINE

## 2025-07-08 PROCEDURE — 99215 OFFICE O/P EST HI 40 MIN: CPT | Performed by: INTERNAL MEDICINE

## 2025-07-08 PROCEDURE — RXMED WILLOW AMBULATORY MEDICATION CHARGE

## 2025-07-08 PROCEDURE — 1036F TOBACCO NON-USER: CPT | Performed by: INTERNAL MEDICINE

## 2025-07-08 RX ORDER — CLONAZEPAM 1 MG/1
1 TABLET ORAL DAILY PRN
Qty: 10 TABLET | Refills: 0 | Status: SHIPPED | OUTPATIENT
Start: 2025-07-08 | End: 2025-07-20

## 2025-07-08 RX ORDER — PHENTERMINE HYDROCHLORIDE 37.5 MG/1
37.5 TABLET ORAL
Qty: 30 TABLET | Refills: 0 | Status: SHIPPED | OUTPATIENT
Start: 2025-07-08 | End: 2025-08-09

## 2025-07-08 RX ORDER — CLONAZEPAM 1 MG/1
1 TABLET ORAL DAILY PRN
COMMUNITY
End: 2025-07-08 | Stop reason: SDUPTHER

## 2025-07-08 NOTE — PROGRESS NOTES
Subjective   Patient ID: Nargis Strong is a 29 y.o. female who presents for Follow-up (One month follow up).    HPI The patient is a pleasant 29-year-old female who presented for a follow-up office visit. She was initiated on Adipex (phentermine) 37.5 mg orally once daily one month prior for the purpose of weight management. At the time of initiation, her weight was recorded at 84.2 kg (185 lbs 9.6 oz) with a body mass index (BMI) of 34.5 kg/m². The patient reports good tolerance to the medication, with only minor side effects including occasional constipation, headaches, and dry mouth. She expresses a desire to continue the current treatment for another month.  The patient is currently enrolled in a master’s degree program and reports significant anxiety related to physical activity and impending final examinations. She has a history of clonazepam use (dosage unspecified) on an as-needed basis for anxiety management several years prior and requests a short course for symptomatic control. A recent urine drug screen was negative. The patient denies any possibility of pregnancy. Review of Systems was negative for fever, chills, dyspnea, chest pain or tightness, abdominal pain, nausea, vomiting, diarrhea, and urinary changes. No additional health concerns were reported during the encounter.  During the clinical encounter the patient requested the screening blood test for hepatitis B and hepatis C.   Social History: Reviewed  Medication List and Allergies: Reviewed  Family and Past Medical History: Reviewed      Review of Systems  The systems have been reviewed as follows:   Constitutional: no fever, no chills  Eyes: no eyesight problems, no eye redness, no eye pain, no blurred vision  ENT: no ear pain or sore throat, no nasal discharge or congestion, no sinus pressure, no hearing loss  Cardiovascular: no chest pain or tightness, no SOB, the heart rate was not fast or slow  Respiratory: no cough, no dyspnea with exertion  "or with rest, no wheezing  Gastrointestinal: no abdominal pain, no  diarrhea, no heartburn, no nausea or vomiting  Genitourinary: no urine frequency, no dysuria, no urinary urgency, no urinary incontinence or retention  Musculoskeletal: no back pain, no arthralgia, no joint swelling or stiffness, no muscle weakness  Integumentary: no skin lesions or rash  Neurological: no dizziness or fainting, no limb weakness  Psychiatric: no mood changes, no depression, no SI/HI  Endocrine: no weight change, no temperature intolerance, no change of appetite  Hematologic/Lymphatic: no easy bruising or bleeding    Objective   /70 (BP Location: Left arm, Patient Position: Sitting)   Pulse 92   Temp 36.3 °C (97.3 °F)   Ht 1.562 m (5' 1.5\")   Wt 78.9 kg (174 lb)   BMI 32.34 kg/m²     Physical Exam  Constitutional: well hydrated, well nourished, no acute distress. Vital signs reviewed  Head and face: normocephalic, atraumatic  Eyes: no erythema, edema or visible abnormalities of conjunctiva or lids. Pupils are equal, round and reactive to light. Extraocular movement normal  ENT: external ears without deformities  Neck: full ROM, no adenopathy, neck was supple  Pulmonary: normal respiratory rate and effort. Lungs are clear to auscultation, no rales,no rhonchi or wheezing  Cardiovascular: RRR, normal S1 and S2, no murmur, no gallop, posterior tib. pulse normal 2+ bilaterally, no lower extremity edema  Abdomen: soft, non tender on palpation, not distended, normal BS in all 4 quadrants  Musculoskeletal: no joint swelling, normal movements of all 4 extremities. Gait and station: normal, Digits and nails: normal without clubbing  Skin: normal color, no rash  Neurologic: Cranial nerves: CN II: visual acuity intact. Source: acuity reported by patient. Pupils reactive to light with normal accommodation. Right and left pupil normal CN III, IV and VI: the EO movements were intact. CN VIII: no hearing loss. Deep tendon reflexes: were 2+ " and symmetric: R and L patella.  Sensory exam: normal to light touch  Psychiatric: Mood and affect: normal, Alert and Oriented x 3  Lymphatic: no cervical lymphadenopathy    Assessment/Plan   Weight Loss Follow-up:  History and physical examination as previously documented.  BMI has decreased from 34 to 32.  Phentermine 37.5 mg orally once daily will be refilled for a 30-day supply.  The OARRS ([place] Automated Rx Reporting System) report has been reviewed.  Patient advised to maintain current lifestyle modifications.    Anxiety with Panic Attacks:  History and physical examination as previously documented.  A refill for clonazepam 1 mg orally, to be taken as needed, is authorized for 10 tablets with no refills.  The OARRS report has been reviewed.  The patient has reviewed and signed the controlled substance agreement for benzodiazepines with the primary care provider. She acknowledged understanding of the potential risks associated with benzodiazepine use, including but not limited to dependence, excessive sedation, accidental overdose, and possible fatality.    The patient is scheduled for screening laboratory tests for hepatitis B and C, including:  Hepatitis B core antibody (HBcAb)  Hepatitis B surface antibody (HBsAb)  Hepatitis B surface antigen (HBsAg)  Hepatitis C antibody (HCV Ab)    A comprehensive discussion regarding all presenting signs and symptoms was conducted with the patient. The patient demonstrated understanding of the diagnosed health condition and potential health risks, even death and expressed agreement with the proposed clinical management plan as outlined above. The patient is advised to follow up with their primary care provider (PCP) as scheduled, or earlier if clinically indicated.

## 2025-07-09 ENCOUNTER — APPOINTMENT (OUTPATIENT)
Dept: PRIMARY CARE | Facility: CLINIC | Age: 30
End: 2025-07-09
Payer: COMMERCIAL

## 2025-07-10 ENCOUNTER — PHARMACY VISIT (OUTPATIENT)
Dept: PHARMACY | Facility: CLINIC | Age: 30
End: 2025-07-10
Payer: COMMERCIAL

## 2025-07-30 ENCOUNTER — APPOINTMENT (OUTPATIENT)
Dept: DERMATOLOGY | Facility: CLINIC | Age: 30
End: 2025-07-30
Payer: COMMERCIAL

## 2025-08-05 ENCOUNTER — APPOINTMENT (OUTPATIENT)
Dept: PRIMARY CARE | Facility: CLINIC | Age: 30
End: 2025-08-05
Payer: COMMERCIAL

## 2025-08-05 VITALS
HEIGHT: 62 IN | TEMPERATURE: 97.3 F | HEART RATE: 75 BPM | SYSTOLIC BLOOD PRESSURE: 117 MMHG | DIASTOLIC BLOOD PRESSURE: 74 MMHG | BODY MASS INDEX: 32.5 KG/M2 | WEIGHT: 176.6 LBS

## 2025-08-05 DIAGNOSIS — Z13.1 SCREENING FOR DIABETES MELLITUS: ICD-10-CM

## 2025-08-05 DIAGNOSIS — H66.91 RIGHT OTITIS MEDIA, UNSPECIFIED OTITIS MEDIA TYPE: ICD-10-CM

## 2025-08-05 DIAGNOSIS — E66.09 CLASS 1 OBESITY DUE TO EXCESS CALORIES WITH BODY MASS INDEX (BMI) OF 34.0 TO 34.9 IN ADULT, UNSPECIFIED WHETHER SERIOUS COMORBIDITY PRESENT: ICD-10-CM

## 2025-08-05 DIAGNOSIS — E03.9 HYPOTHYROIDISM, UNSPECIFIED TYPE: ICD-10-CM

## 2025-08-05 DIAGNOSIS — H60.91 OTITIS EXTERNA OF RIGHT EAR, UNSPECIFIED CHRONICITY, UNSPECIFIED TYPE: Primary | ICD-10-CM

## 2025-08-05 DIAGNOSIS — Z76.0 ENCOUNTER FOR MEDICATION REFILL: ICD-10-CM

## 2025-08-05 DIAGNOSIS — E66.811 CLASS 1 OBESITY DUE TO EXCESS CALORIES WITH BODY MASS INDEX (BMI) OF 34.0 TO 34.9 IN ADULT, UNSPECIFIED WHETHER SERIOUS COMORBIDITY PRESENT: ICD-10-CM

## 2025-08-05 DIAGNOSIS — Z13.220 LIPID SCREENING: ICD-10-CM

## 2025-08-05 PROCEDURE — RXMED WILLOW AMBULATORY MEDICATION CHARGE

## 2025-08-05 PROCEDURE — 99215 OFFICE O/P EST HI 40 MIN: CPT | Performed by: INTERNAL MEDICINE

## 2025-08-05 PROCEDURE — 3008F BODY MASS INDEX DOCD: CPT | Performed by: INTERNAL MEDICINE

## 2025-08-05 PROCEDURE — 1036F TOBACCO NON-USER: CPT | Performed by: INTERNAL MEDICINE

## 2025-08-05 RX ORDER — PHENTERMINE HYDROCHLORIDE 37.5 MG/1
37.5 TABLET ORAL
Qty: 30 TABLET | Refills: 0 | Status: SHIPPED | OUTPATIENT
Start: 2025-08-05 | End: 2025-09-06

## 2025-08-05 RX ORDER — AMOXICILLIN AND CLAVULANATE POTASSIUM 875; 125 MG/1; MG/1
875 TABLET, FILM COATED ORAL 2 TIMES DAILY
Qty: 20 TABLET | Refills: 0 | Status: SHIPPED | OUTPATIENT
Start: 2025-08-05 | End: 2025-08-17

## 2025-08-05 RX ORDER — CIPROFLOXACIN AND DEXAMETHASONE 3; 1 MG/ML; MG/ML
4 SUSPENSION/ DROPS AURICULAR (OTIC) 2 TIMES DAILY
Qty: 7.5 ML | Refills: 0 | Status: SHIPPED | OUTPATIENT
Start: 2025-08-05 | End: 2025-08-22

## 2025-08-05 NOTE — PROGRESS NOTES
Subjective   Patient ID: Nargis Strong is a 29 y.o. female who presents for Follow-up and Earache (Possible infection in her R ear, states it been hurting for the past 3 days).    HPI Pt is a pleasant 28 yo F who was seen and evaluated during the FU OV> Pt states that she tolerated the current dose of  adipex well and denies any active concerns. 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 Duirng the clinical encounter pt also mentioned about the right ear pain and congestion for the last couple days. Pt used OTC debrox, but it did not help much. PT denies any Hx of recent travel, no hx of recent URI or ear trauma, no recent swimming. During the clinical encounter pt denies fever, chills, no SOB, no chest pain/tightness, no abdominal pain, no N/V/D reported, no change of urination reported. Pt denies any other health concerns during this visit.  Sohx: reviewed  List of the medications and allergies: reviewed  Fhx and PMHx: reviewed      Review of Systems  The systems have been reviewed as follows:   Constitutional: no fever, no chills  Eyes: no eyesight problems, no eye redness, no eye pain, no blurred vision  ENT: no ear pain or sore throat, no nasal discharge or congestion, no sinus pressure, but as mentioned at HPI  Cardiovascular: no chest pain or tightness, no SOB, the heart rate was not fast or slow  Respiratory: no cough, no dyspnea with exertion or with rest, no wheezing  Gastrointestinal: no abdominal pain, no constipation or diarrhea, no heartburn, no nausea or vomiting  Genitourinary: no urine frequency, no dysuria, no urinary urgency, no urinary incontinence or retention  Musculoskeletal: no back pain, no arthralgia, no joint swelling or stiffness, no muscle weakness  Integumentary: no skin lesions or rash  Neurological: no headaches, no dizziness or fainting, no limb weakness  Psychiatric: no mood changes, no anxiety or depression, no  "SI/HI  Endocrine: no weight change, no temperature intolerance, no change of appetite  Hematologic/Lymphatic: no easy bruising or bleeding    Objective   /74 (BP Location: Right arm, Patient Position: Sitting)   Pulse 75   Temp 36.3 °C (97.3 °F)   Ht 1.562 m (5' 1.5\")   Wt 80.1 kg (176 lb 9.6 oz)   BMI 32.83 kg/m²     Physical Exam  Constitutional: well hydrated, well nourished, no acute distress. Vital signs reviewed  Head and face: normocephalic, atraumatic  Eyes: no erythema, edema or visible abnormalities of conjunctiva or lids. Pupils are equal, round and reactive to light. Extraocular movement normal  ENT: external ears without deformities, R external ear canal: erythema and swellin, redness of the right TM. The left ear exam was WNL  Neck: full ROM, no adenopathy, neck was supple, thyroid gland not enlarged, no palpable nodules  Pulmonary: normal respiratory rate and effort. Lungs are clear to auscultation, no rales,no rhonchi or wheezing  Cardiovascular: RRR, normal S1 and S2, no murmur, no gallop, posterior tib. pulse normal 2+ bilaterally, no lower extremity edema  Abdomen: soft, non tender on palpation, not distended, normal BS in all 4 quadrants  Musculoskeletal: no joint swelling, normal movements of all 4 extremities. Gait and station: normal, Digits and nails: normal without clubbing  Skin: normal color, no rash  Neurologic: Cranial nerves: CN II: visual acuity intact. Source: acuity reported by patient. Pupils reactive to light with normal accommodation. Right and left pupil normal CN III, IV and VI: the EO movements were intact. CN VIII: no hearing loss. Deep tendon reflexes: were 2+ and symmetric: R and L patella.  Sensory exam: normal to light touch  Psychiatric: Mood and affect: normal, Alert and Oriented x 3  Lymphatic: no cervical lymphadenopathy  Assessment/Plan   Acute otitis externa, Acute otitis media:  Hx and PE as mentioned  Will start on 7 day Tx with ciprodex top solution and " Amox/Clav 875/125 mg PO BID x 10 days  Pt was instructed to contact PCP if pt will have no improvement of the condition/worsening of the sxs/new sxs on the current treatment    BMI of 32, obesity WHO class I:  Hx and PE as mentioned  Pt would like to continue the last month on phentermine 37.5 mg PO daily and then possible switch to Qsymia treatment. Pt denies any chance of pregnancy  OARRS report was checked and last refill for phentermine was provided  Will also order CBC, CMP,  Lipid panel and HGBA1C for the lipid disorders and DM screening purposes.  Hypothyroidism: on levothyroxine 137 mcg PO daily. FU with the endocrinology team as well as the TSH/T4 testing.      A comprehensive discussion regarding all presenting signs and symptoms was conducted with the patient. The patient demonstrated understanding of the diagnosed health condition and expressed agreement with the proposed clinical management plan as outlined above. The patient is advised to follow up with their primary care provider (PCP) as scheduled, or earlier if clinically indicated.

## 2025-08-06 ENCOUNTER — APPOINTMENT (OUTPATIENT)
Dept: OBSTETRICS AND GYNECOLOGY | Facility: CLINIC | Age: 30
End: 2025-08-06
Payer: COMMERCIAL

## 2025-08-07 ENCOUNTER — PATIENT MESSAGE (OUTPATIENT)
Dept: PRIMARY CARE | Facility: CLINIC | Age: 30
End: 2025-08-07
Payer: COMMERCIAL

## 2025-08-07 ENCOUNTER — PHARMACY VISIT (OUTPATIENT)
Dept: PHARMACY | Facility: CLINIC | Age: 30
End: 2025-08-07
Payer: COMMERCIAL

## 2025-08-07 DIAGNOSIS — R79.0 ABNORMAL RESULT OF IRON PROFILE TESTING: Primary | ICD-10-CM

## 2025-08-07 DIAGNOSIS — Z86.39 HISTORY OF VITAMIN B DEFICIENCY: ICD-10-CM

## 2025-08-07 DIAGNOSIS — E55.9 VITAMIN D INSUFFICIENCY: ICD-10-CM

## 2025-08-23 LAB
HBV CORE AB SERPL QL IA: NORMAL
HBV SURFACE AB SERPL IA-ACNC: 25 MIU/ML
HBV SURFACE AG SERPL QL IA: NORMAL
HCV AB SERPL QL IA: NORMAL

## 2025-08-29 LAB
25(OH)D3+25(OH)D2 SERPL-MCNC: 58 NG/ML (ref 30–100)
ALBUMIN SERPL-MCNC: 4.3 G/DL (ref 3.6–5.1)
ALP SERPL-CCNC: 58 U/L (ref 31–125)
ALT SERPL-CCNC: 9 U/L (ref 6–29)
ANION GAP SERPL CALCULATED.4IONS-SCNC: 9 MMOL/L (CALC) (ref 7–17)
AST SERPL-CCNC: 13 U/L (ref 10–30)
BASOPHILS # BLD AUTO: 42 CELLS/UL (ref 0–200)
BASOPHILS NFR BLD AUTO: 0.9 %
BILIRUB SERPL-MCNC: 0.4 MG/DL (ref 0.2–1.2)
BUN SERPL-MCNC: 12 MG/DL (ref 7–25)
CALCIUM SERPL-MCNC: 9 MG/DL (ref 8.6–10.2)
CHLORIDE SERPL-SCNC: 105 MMOL/L (ref 98–110)
CHOLEST SERPL-MCNC: 173 MG/DL
CHOLEST/HDLC SERPL: 4.3 (CALC)
CO2 SERPL-SCNC: 24 MMOL/L (ref 20–32)
CREAT SERPL-MCNC: 0.67 MG/DL (ref 0.5–0.96)
EGFRCR SERPLBLD CKD-EPI 2021: 123 ML/MIN/1.73M2
EOSINOPHIL # BLD AUTO: 108 CELLS/UL (ref 15–500)
EOSINOPHIL NFR BLD AUTO: 2.3 %
ERYTHROCYTE [DISTWIDTH] IN BLOOD BY AUTOMATED COUNT: 13.9 % (ref 11–15)
EST. AVERAGE GLUCOSE BLD GHB EST-MCNC: 108 MG/DL
EST. AVERAGE GLUCOSE BLD GHB EST-SCNC: 6 MMOL/L
FERRITIN SERPL-MCNC: 154 NG/ML (ref 16–154)
GLUCOSE SERPL-MCNC: 89 MG/DL (ref 65–99)
HBA1C MFR BLD: 5.4 %
HCT VFR BLD AUTO: 43.1 % (ref 35–45)
HDLC SERPL-MCNC: 40 MG/DL
HGB BLD-MCNC: 13.7 G/DL (ref 11.7–15.5)
IRON SATN MFR SERPL: 31 % (CALC) (ref 16–45)
IRON SERPL-MCNC: 81 MCG/DL (ref 40–190)
LDLC SERPL CALC-MCNC: 117 MG/DL (CALC)
LYMPHOCYTES # BLD AUTO: 1716 CELLS/UL (ref 850–3900)
LYMPHOCYTES NFR BLD AUTO: 36.5 %
MCH RBC QN AUTO: 28.5 PG (ref 27–33)
MCHC RBC AUTO-ENTMCNC: 31.8 G/DL (ref 32–36)
MCV RBC AUTO: 89.8 FL (ref 80–100)
MONOCYTES # BLD AUTO: 531 CELLS/UL (ref 200–950)
MONOCYTES NFR BLD AUTO: 11.3 %
NEUTROPHILS # BLD AUTO: 2303 CELLS/UL (ref 1500–7800)
NEUTROPHILS NFR BLD AUTO: 49 %
NONHDLC SERPL-MCNC: 133 MG/DL (CALC)
PLATELET # BLD AUTO: 363 THOUSAND/UL (ref 140–400)
PMV BLD REES-ECKER: 9.7 FL (ref 7.5–12.5)
POTASSIUM SERPL-SCNC: 4.5 MMOL/L (ref 3.5–5.3)
PROT SERPL-MCNC: 7.2 G/DL (ref 6.1–8.1)
RBC # BLD AUTO: 4.8 MILLION/UL (ref 3.8–5.1)
SODIUM SERPL-SCNC: 138 MMOL/L (ref 135–146)
TIBC SERPL-MCNC: 260 MCG/DL (CALC) (ref 250–450)
TRIGL SERPL-MCNC: 67 MG/DL
VIT B12 SERPL-MCNC: 524 PG/ML (ref 200–1100)
WBC # BLD AUTO: 4.7 THOUSAND/UL (ref 3.8–10.8)

## 2025-09-02 ENCOUNTER — PATIENT MESSAGE (OUTPATIENT)
Dept: PRIMARY CARE | Facility: CLINIC | Age: 30
End: 2025-09-02

## 2025-09-02 ENCOUNTER — APPOINTMENT (OUTPATIENT)
Dept: ORTHOPEDIC SURGERY | Facility: CLINIC | Age: 30
End: 2025-09-02
Payer: COMMERCIAL

## 2025-09-03 ENCOUNTER — PHARMACY VISIT (OUTPATIENT)
Dept: PHARMACY | Facility: CLINIC | Age: 30
End: 2025-09-03
Payer: COMMERCIAL

## 2025-09-03 ENCOUNTER — APPOINTMENT (OUTPATIENT)
Dept: OBSTETRICS AND GYNECOLOGY | Facility: CLINIC | Age: 30
End: 2025-09-03
Payer: COMMERCIAL

## 2025-09-03 PROCEDURE — RXMED WILLOW AMBULATORY MEDICATION CHARGE

## 2025-09-03 RX ORDER — CYCLOBENZAPRINE HCL 10 MG
TABLET ORAL
Qty: 15 TABLET | Refills: 0 | OUTPATIENT
Start: 2025-09-03

## 2025-09-03 RX ORDER — METHYLPREDNISOLONE 4 MG/1
TABLET ORAL
Qty: 21 TABLET | Refills: 0 | OUTPATIENT
Start: 2025-09-03

## 2025-09-04 ENCOUNTER — APPOINTMENT (OUTPATIENT)
Dept: PRIMARY CARE | Facility: CLINIC | Age: 30
End: 2025-09-04
Payer: COMMERCIAL

## 2025-09-19 ENCOUNTER — APPOINTMENT (OUTPATIENT)
Dept: PRIMARY CARE | Facility: CLINIC | Age: 30
End: 2025-09-19
Payer: COMMERCIAL

## 2025-10-13 ENCOUNTER — APPOINTMENT (OUTPATIENT)
Facility: CLINIC | Age: 30
End: 2025-10-13
Payer: COMMERCIAL